# Patient Record
Sex: FEMALE | Race: BLACK OR AFRICAN AMERICAN | NOT HISPANIC OR LATINO | Employment: FULL TIME | URBAN - NONMETROPOLITAN AREA
[De-identification: names, ages, dates, MRNs, and addresses within clinical notes are randomized per-mention and may not be internally consistent; named-entity substitution may affect disease eponyms.]

---

## 2020-11-16 ENCOUNTER — HOSPITAL ENCOUNTER (EMERGENCY)
Facility: HOSPITAL | Age: 30
Discharge: HOME | End: 2020-11-16
Attending: EMERGENCY MEDICINE
Payer: MEDICAID

## 2020-11-16 VITALS
HEART RATE: 101 BPM | DIASTOLIC BLOOD PRESSURE: 71 MMHG | WEIGHT: 203 LBS | RESPIRATION RATE: 18 BRPM | SYSTOLIC BLOOD PRESSURE: 142 MMHG | OXYGEN SATURATION: 99 % | BODY MASS INDEX: 35.97 KG/M2 | HEIGHT: 63 IN | TEMPERATURE: 98 F

## 2020-11-16 DIAGNOSIS — J32.9 SINUSITIS, UNSPECIFIED CHRONICITY, UNSPECIFIED LOCATION: Primary | ICD-10-CM

## 2020-11-16 PROCEDURE — 99284 EMERGENCY DEPT VISIT MOD MDM: CPT

## 2020-11-16 RX ORDER — AMOXICILLIN AND CLAVULANATE POTASSIUM 875; 125 MG/1; MG/1
1 TABLET, FILM COATED ORAL 2 TIMES DAILY
Qty: 20 TABLET | Refills: 0 | Status: SHIPPED | OUTPATIENT
Start: 2020-11-16 | End: 2020-11-26

## 2020-11-16 RX ORDER — FLUTICASONE PROPIONATE 50 MCG
1 SPRAY, SUSPENSION (ML) NASAL 2 TIMES DAILY PRN
Qty: 16 G | Refills: 0 | Status: SHIPPED | OUTPATIENT
Start: 2020-11-16

## 2020-11-16 RX ORDER — IBUPROFEN 800 MG/1
800 TABLET ORAL EVERY 8 HOURS PRN
Qty: 20 TABLET | Refills: 0 | Status: SHIPPED | OUTPATIENT
Start: 2020-11-16 | End: 2020-11-21

## 2020-11-16 RX ORDER — CETIRIZINE HYDROCHLORIDE 10 MG/1
10 TABLET ORAL DAILY
Qty: 30 TABLET | Refills: 0 | Status: SHIPPED | OUTPATIENT
Start: 2020-11-16 | End: 2021-05-18

## 2020-11-16 NOTE — ED PROVIDER NOTES
Encounter Date: 11/16/2020       History     Chief Complaint   Patient presents with    Nasal Congestion     Nasal congestion with blood tinged mucus when blowing nose x1 week     29-year-old female complaining of sinus congestion, yellow nasal drainage with blowing her nose x2 weeks.  No sore throat.  No fever.  No other symptomatology.  States she came in this evening secondary to difficulty breathing from her nasal congestion while she was sleeping this evening.  No prior treatment.        Review of patient's allergies indicates:   Allergen Reactions    Iodine and iodide containing products      Lip swelling     History reviewed. No pertinent past medical history.  History reviewed. No pertinent surgical history.  History reviewed. No pertinent family history.  Social History     Tobacco Use    Smoking status: Never Smoker    Smokeless tobacco: Never Used   Substance Use Topics    Alcohol use: Not Currently    Drug use: Not on file     Review of Systems   Constitutional: Negative for fever.   HENT: Positive for congestion. Negative for ear pain, facial swelling, nosebleeds, rhinorrhea, sinus pressure, sinus pain, sneezing and sore throat.    Eyes: Negative for visual disturbance.   Respiratory: Negative for shortness of breath.    Cardiovascular: Negative for chest pain.   Gastrointestinal: Negative for abdominal pain.   Genitourinary: Negative for difficulty urinating.   Musculoskeletal: Negative for back pain.   Skin: Negative for rash.   Neurological: Negative for headaches.       Physical Exam     Initial Vitals [11/16/20 0149]   BP Pulse Resp Temp SpO2   (!) 142/71 101 18 98.4 °F (36.9 °C) 99 %      MAP       --         Physical Exam    Nursing note and vitals reviewed.  Constitutional: She appears well-developed and well-nourished.   HENT:   Mouth/Throat: No oropharyngeal exudate.   No sinus tenderness   Eyes: EOM are normal. Pupils are equal, round, and reactive to light.   Neck: Normal range of  motion. Neck supple. No thyromegaly present. No JVD present.   Cardiovascular: Normal rate, regular rhythm, normal heart sounds and intact distal pulses. Exam reveals no gallop and no friction rub.    No murmur heard.  Pulmonary/Chest: Breath sounds normal. No respiratory distress.   Abdominal: Soft. Bowel sounds are normal.   Musculoskeletal: Normal range of motion. No tenderness or edema.   Neurological: She is alert and oriented to person, place, and time. She has normal strength.   Skin: Skin is warm and dry. Capillary refill takes less than 2 seconds.         ED Course   Procedures  Labs Reviewed - No data to display      patient declined COVID testing  Imaging Results    None      Due to sinusitis symptomatology ongoing 2 weeks without resolution will treat with antibiotics, antihistamines, steroid nasal spray, anti-inflammatories.                                 Clinical Impression:       ICD-10-CM ICD-9-CM   1. Sinusitis, unspecified chronicity, unspecified location  J32.9 473.9                          ED Disposition Condition    Discharge Stable        ED Prescriptions     Medication Sig Dispense Start Date End Date Auth. Provider    amoxicillin-clavulanate 875-125mg (AUGMENTIN) 875-125 mg per tablet Take 1 tablet by mouth 2 (two) times daily. for 10 days 20 tablet 11/16/2020 11/26/2020 Timbo Luna MD    fluticasone propionate (FLONASE) 50 mcg/actuation nasal spray 1 spray (50 mcg total) by Each Nostril route 2 (two) times daily as needed for Rhinitis. 16 g 11/16/2020  Timbo Luna MD    cetirizine (ZYRTEC) 10 MG tablet Take 1 tablet (10 mg total) by mouth once daily. 30 tablet 11/16/2020 11/16/2021 Timbo Luna MD    ibuprofen (ADVIL,MOTRIN) 800 MG tablet Take 1 tablet (800 mg total) by mouth every 8 (eight) hours as needed for Pain. 20 tablet 11/16/2020 11/21/2020 Timbo Luna MD        Follow-up Information     Follow up With Specialties Details Why Contact Info    [facility]  MANOHAR Castillo MD Otolaryngology Schedule an appointment as soon as possible for a visit  or ENT of your choice if therapy does not resolve symptoms [address] [address]  [phone]                                         Timbo Luna MD  11/16/20 0223

## 2020-12-07 ENCOUNTER — TELEPHONE (OUTPATIENT)
Dept: OBSTETRICS AND GYNECOLOGY | Facility: CLINIC | Age: 30
End: 2020-12-07

## 2020-12-07 RX ORDER — NORETHINDRONE 0.35 MG
1 KIT ORAL DAILY
COMMUNITY
Start: 2020-11-07 | End: 2020-12-08 | Stop reason: ALTCHOICE

## 2020-12-08 ENCOUNTER — OFFICE VISIT (OUTPATIENT)
Dept: OBSTETRICS AND GYNECOLOGY | Facility: CLINIC | Age: 30
End: 2020-12-08
Payer: MEDICAID

## 2020-12-08 VITALS
WEIGHT: 203 LBS | DIASTOLIC BLOOD PRESSURE: 55 MMHG | HEART RATE: 66 BPM | BODY MASS INDEX: 35.97 KG/M2 | SYSTOLIC BLOOD PRESSURE: 86 MMHG | HEIGHT: 63 IN

## 2020-12-08 DIAGNOSIS — N92.6 IRREGULAR MENSES: Primary | ICD-10-CM

## 2020-12-08 DIAGNOSIS — Z30.41 SURVEILLANCE OF CONTRACEPTIVE PILL: ICD-10-CM

## 2020-12-08 LAB — HCG INTACT+B SERPL-ACNC: <1 MIU/ML

## 2020-12-08 PROCEDURE — 99999 PR PBB SHADOW E&M-EST. PATIENT-LVL II: ICD-10-PCS | Mod: PBBFAC,,, | Performed by: OBSTETRICS & GYNECOLOGY

## 2020-12-08 PROCEDURE — 84702 CHORIONIC GONADOTROPIN TEST: CPT

## 2020-12-08 PROCEDURE — 99213 PR OFFICE/OUTPT VISIT, EST, LEVL III, 20-29 MIN: ICD-10-PCS | Mod: S$PBB,,, | Performed by: OBSTETRICS & GYNECOLOGY

## 2020-12-08 PROCEDURE — 99212 OFFICE O/P EST SF 10 MIN: CPT | Mod: PBBFAC | Performed by: OBSTETRICS & GYNECOLOGY

## 2020-12-08 PROCEDURE — 99213 OFFICE O/P EST LOW 20 MIN: CPT | Mod: S$PBB,,, | Performed by: OBSTETRICS & GYNECOLOGY

## 2020-12-08 PROCEDURE — 99999 PR PBB SHADOW E&M-EST. PATIENT-LVL II: CPT | Mod: PBBFAC,,, | Performed by: OBSTETRICS & GYNECOLOGY

## 2020-12-08 RX ORDER — NORGESTIMATE AND ETHINYL ESTRADIOL 0.25-0.035
1 KIT ORAL DAILY
Qty: 28 TABLET | Refills: 11 | Status: SHIPPED | OUTPATIENT
Start: 2020-12-08 | End: 2021-11-16

## 2020-12-08 NOTE — PROGRESS NOTES
Subjective:    Patient ID: Jay John is a 29 y.o. y.o. female    Chief Complaint:   Chief Complaint   Patient presents with    Metrorrhagia     cycles lasting 5-7 days and came early last month       History of Present Illness:  Jay presents today for evaluation of irregular menses.  Patient is still on progesterone only pill.  She has since breastfeeding and states that her menses have been a little irregular.  She states that she took a pregnancy test last week and it was positive she has taken a couple more and some were positive in some more negative.  She has also complained of some nausea      Review of Systems   Constitutional: Negative for chills, fatigue and fever.   Respiratory: Negative for shortness of breath.    Cardiovascular: Negative for chest pain.   Gastrointestinal: Positive for nausea. Negative for abdominal pain, constipation and diarrhea.   Genitourinary: Negative for bladder incontinence, dysuria, hot flashes, pelvic pain and vaginal bleeding.   Neurological: Negative for headaches.   Psychiatric/Behavioral: Negative for depression.         Objective:    Vital Signs:  Vitals:    12/08/20 1118   BP: (!) 86/55   Pulse: 66       Physical Exam:  General:  alert, no distress   Skin:  Skin color, texture, turgor normal. No rashes or lesions   Neck: supple, trachea midline, no adenopathy or thyromegaly   Respiratory:  clear to auscultation bilaterally   Heart:  regular rate and rhythm, S1, S2 normal, no murmur, click, rub or gallop   Abdomen:  Soft, non-tender. Bowel sounds normal. No masses,  no organomegaly   Extremities: No cyanosis, clubbing, edema               Assessment:      1. Irregular menses    2. Surveillance of contraceptive pill          Plan:      Irregular menses  -     HCG, Quantitative; Future; Expected date: 12/08/2020    Surveillance of contraceptive pill    Other orders  -     norgestimate-ethinyl estradioL (ORTHO-CYCLEN) 0.25-35 mg-mcg per tablet; Take 1 tablet by mouth  once daily.  Dispense: 28 tablet; Refill: 11          Peri Jones MD, FACOG

## 2021-01-21 ENCOUNTER — OFFICE VISIT (OUTPATIENT)
Dept: OBSTETRICS AND GYNECOLOGY | Facility: CLINIC | Age: 31
End: 2021-01-21
Payer: MEDICAID

## 2021-01-21 VITALS
WEIGHT: 198 LBS | SYSTOLIC BLOOD PRESSURE: 126 MMHG | HEART RATE: 55 BPM | HEIGHT: 63 IN | BODY MASS INDEX: 35.08 KG/M2 | DIASTOLIC BLOOD PRESSURE: 60 MMHG

## 2021-01-21 DIAGNOSIS — B37.9 YEAST INFECTION: Primary | ICD-10-CM

## 2021-01-21 DIAGNOSIS — B35.6 TINEA CRURIS: ICD-10-CM

## 2021-01-21 PROCEDURE — 99999 PR PBB SHADOW E&M-EST. PATIENT-LVL III: CPT | Mod: PBBFAC,,, | Performed by: OBSTETRICS & GYNECOLOGY

## 2021-01-21 PROCEDURE — 99213 PR OFFICE/OUTPT VISIT, EST, LEVL III, 20-29 MIN: ICD-10-PCS | Mod: S$PBB,,, | Performed by: OBSTETRICS & GYNECOLOGY

## 2021-01-21 PROCEDURE — 99213 OFFICE O/P EST LOW 20 MIN: CPT | Mod: S$PBB,,, | Performed by: OBSTETRICS & GYNECOLOGY

## 2021-01-21 PROCEDURE — 99213 OFFICE O/P EST LOW 20 MIN: CPT | Mod: PBBFAC | Performed by: OBSTETRICS & GYNECOLOGY

## 2021-01-21 PROCEDURE — 99999 PR PBB SHADOW E&M-EST. PATIENT-LVL III: ICD-10-PCS | Mod: PBBFAC,,, | Performed by: OBSTETRICS & GYNECOLOGY

## 2021-01-21 RX ORDER — FLUCONAZOLE 100 MG/1
100 TABLET ORAL DAILY
Qty: 3 TABLET | Refills: 0 | Status: SHIPPED | OUTPATIENT
Start: 2021-01-21 | End: 2021-01-24

## 2021-01-21 RX ORDER — CLOTRIMAZOLE 1 %
CREAM (GRAM) TOPICAL
Qty: 30 G | Refills: 1 | Status: SHIPPED | OUTPATIENT
Start: 2021-01-21 | End: 2021-05-18

## 2021-05-18 ENCOUNTER — OFFICE VISIT (OUTPATIENT)
Dept: OBSTETRICS AND GYNECOLOGY | Facility: CLINIC | Age: 31
End: 2021-05-18
Payer: MEDICAID

## 2021-05-18 VITALS
WEIGHT: 192 LBS | DIASTOLIC BLOOD PRESSURE: 78 MMHG | HEART RATE: 73 BPM | BODY MASS INDEX: 34.02 KG/M2 | HEIGHT: 63 IN | SYSTOLIC BLOOD PRESSURE: 129 MMHG

## 2021-05-18 DIAGNOSIS — Z01.419 ROUTINE GYNECOLOGICAL EXAMINATION: Primary | ICD-10-CM

## 2021-05-18 DIAGNOSIS — N89.8 VAGINAL DISCHARGE: ICD-10-CM

## 2021-05-18 DIAGNOSIS — Z11.3 SCREENING EXAMINATION FOR VENEREAL DISEASE: ICD-10-CM

## 2021-05-18 DIAGNOSIS — Z12.4 SCREENING FOR MALIGNANT NEOPLASM OF THE CERVIX: ICD-10-CM

## 2021-05-18 PROCEDURE — 86696 HERPES SIMPLEX TYPE 2 TEST: CPT | Performed by: NURSE PRACTITIONER

## 2021-05-18 PROCEDURE — 86703 HIV-1/HIV-2 1 RESULT ANTBDY: CPT | Performed by: NURSE PRACTITIONER

## 2021-05-18 PROCEDURE — 87661 TRICHOMONAS VAGINALIS AMPLIF: CPT | Performed by: NURSE PRACTITIONER

## 2021-05-18 PROCEDURE — 86592 SYPHILIS TEST NON-TREP QUAL: CPT | Performed by: NURSE PRACTITIONER

## 2021-05-18 PROCEDURE — 99213 OFFICE O/P EST LOW 20 MIN: CPT | Mod: PBBFAC | Performed by: NURSE PRACTITIONER

## 2021-05-18 PROCEDURE — 80074 ACUTE HEPATITIS PANEL: CPT | Performed by: NURSE PRACTITIONER

## 2021-05-18 PROCEDURE — 99395 PREV VISIT EST AGE 18-39: CPT | Mod: S$PBB,,, | Performed by: NURSE PRACTITIONER

## 2021-05-18 PROCEDURE — 99999 PR PBB SHADOW E&M-EST. PATIENT-LVL III: ICD-10-PCS | Mod: PBBFAC,,, | Performed by: NURSE PRACTITIONER

## 2021-05-18 PROCEDURE — 99395 PR PREVENTIVE VISIT,EST,18-39: ICD-10-PCS | Mod: S$PBB,,, | Performed by: NURSE PRACTITIONER

## 2021-05-18 PROCEDURE — 99999 PR PBB SHADOW E&M-EST. PATIENT-LVL III: CPT | Mod: PBBFAC,,, | Performed by: NURSE PRACTITIONER

## 2021-05-18 PROCEDURE — 86695 HERPES SIMPLEX TYPE 1 TEST: CPT | Performed by: NURSE PRACTITIONER

## 2021-05-18 RX ORDER — METRONIDAZOLE 500 MG/1
500 TABLET ORAL 2 TIMES DAILY
Qty: 14 TABLET | Refills: 0 | Status: SHIPPED | OUTPATIENT
Start: 2021-05-18 | End: 2021-05-25

## 2021-05-19 LAB
HSV1 IGG SERPL QL IA: POSITIVE
HSV2 IGG SERPL QL IA: NEGATIVE
RPR SER QL: NORMAL

## 2021-05-20 LAB
HAV IGM SERPL QL IA: NEGATIVE
HBV CORE IGM SERPL QL IA: NEGATIVE
HBV SURFACE AG SERPL QL IA: NEGATIVE
HCV AB SERPL QL IA: NEGATIVE
HIV 1+2 AB+HIV1 P24 AG SERPL QL IA: NEGATIVE

## 2021-05-21 LAB
CHLAMYDIA/N. GONORROHEAE AMP DNA: NORMAL
HPV16+18+H RISK 12 DNA CVX-IMP: NORMAL
LIQUID BASED PAP SMEAR, SCREENING: NORMAL
TRICHOMONAS VAGINALIS, DNA, URINE: NORMAL

## 2021-09-20 ENCOUNTER — HOSPITAL ENCOUNTER (EMERGENCY)
Facility: HOSPITAL | Age: 31
Discharge: HOME | End: 2021-09-20
Attending: STUDENT IN AN ORGANIZED HEALTH CARE EDUCATION/TRAINING PROGRAM
Payer: MEDICAID

## 2021-09-20 VITALS
HEIGHT: 63 IN | OXYGEN SATURATION: 96 % | WEIGHT: 203 LBS | SYSTOLIC BLOOD PRESSURE: 119 MMHG | DIASTOLIC BLOOD PRESSURE: 85 MMHG | HEART RATE: 80 BPM | TEMPERATURE: 98 F | RESPIRATION RATE: 16 BRPM | BODY MASS INDEX: 35.97 KG/M2

## 2021-09-20 DIAGNOSIS — M25.552 HIP PAIN, LEFT: ICD-10-CM
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
## 2021-09-20 PROCEDURE — 25000003 PHARM REV CODE 250: Performed by: STUDENT IN AN ORGANIZED HEALTH CARE EDUCATION/TRAINING PROGRAM

## 2021-09-20 PROCEDURE — 99283 EMERGENCY DEPT VISIT LOW MDM: CPT

## 2021-09-20 RX ORDER — IBUPROFEN 600 MG/1
600 TABLET ORAL
Status: COMPLETED | OUTPATIENT
Start: 2021-09-20 | End: 2021-09-20

## 2021-09-20 RX ORDER — CYCLOBENZAPRINE HCL 10 MG
10 TABLET ORAL 3 TIMES DAILY PRN
Qty: 15 TABLET | Refills: 0 | Status: SHIPPED | OUTPATIENT
Start: 2021-09-20 | End: 2021-09-25

## 2021-09-20 RX ADMIN — IBUPROFEN 600 MG: 600 TABLET, FILM COATED ORAL at 10:09

## 2021-10-21 ENCOUNTER — OFFICE VISIT (OUTPATIENT)
Dept: OBSTETRICS AND GYNECOLOGY | Facility: CLINIC | Age: 31
End: 2021-10-21
Payer: MEDICAID

## 2021-10-21 VITALS
BODY MASS INDEX: 35.79 KG/M2 | HEIGHT: 63 IN | DIASTOLIC BLOOD PRESSURE: 82 MMHG | WEIGHT: 202 LBS | HEART RATE: 65 BPM | SYSTOLIC BLOOD PRESSURE: 126 MMHG

## 2021-10-21 DIAGNOSIS — R30.0 DYSURIA: Primary | ICD-10-CM

## 2021-10-21 DIAGNOSIS — Z11.3 SCREENING FOR STDS (SEXUALLY TRANSMITTED DISEASES): ICD-10-CM

## 2021-10-21 LAB
BILIRUB SERPL-MCNC: NORMAL MG/DL
BLOOD URINE, POC: NORMAL
CLARITY, POC UA: CLEAR
COLOR, POC UA: NORMAL
GLUCOSE UR QL STRIP: NORMAL
KETONES UR QL STRIP: NORMAL
LEUKOCYTE ESTERASE URINE, POC: NORMAL
NITRITE, POC UA: NORMAL
PH, POC UA: 5.5
PROTEIN, POC: NORMAL
SPECIFIC GRAVITY, POC UA: 1
UROBILINOGEN, POC UA: NORMAL

## 2021-10-21 PROCEDURE — 87088 URINE BACTERIA CULTURE: CPT | Performed by: NURSE PRACTITIONER

## 2021-10-21 PROCEDURE — 81002 URINALYSIS NONAUTO W/O SCOPE: CPT | Mod: PBBFAC | Performed by: NURSE PRACTITIONER

## 2021-10-21 PROCEDURE — 99213 PR OFFICE/OUTPT VISIT, EST, LEVL III, 20-29 MIN: ICD-10-PCS | Mod: S$PBB,,, | Performed by: NURSE PRACTITIONER

## 2021-10-21 PROCEDURE — 99213 OFFICE O/P EST LOW 20 MIN: CPT | Mod: PBBFAC | Performed by: NURSE PRACTITIONER

## 2021-10-21 PROCEDURE — 87077 CULTURE AEROBIC IDENTIFY: CPT | Performed by: NURSE PRACTITIONER

## 2021-10-21 PROCEDURE — 87661 TRICHOMONAS VAGINALIS AMPLIF: CPT | Performed by: NURSE PRACTITIONER

## 2021-10-21 PROCEDURE — 87086 URINE CULTURE/COLONY COUNT: CPT | Performed by: NURSE PRACTITIONER

## 2021-10-21 PROCEDURE — 99213 OFFICE O/P EST LOW 20 MIN: CPT | Mod: S$PBB,,, | Performed by: NURSE PRACTITIONER

## 2021-10-21 PROCEDURE — 99999 PR PBB SHADOW E&M-EST. PATIENT-LVL III: CPT | Mod: PBBFAC,,, | Performed by: NURSE PRACTITIONER

## 2021-10-21 PROCEDURE — 87186 SC STD MICRODIL/AGAR DIL: CPT | Performed by: NURSE PRACTITIONER

## 2021-10-21 PROCEDURE — 99999 PR PBB SHADOW E&M-EST. PATIENT-LVL III: ICD-10-PCS | Mod: PBBFAC,,, | Performed by: NURSE PRACTITIONER

## 2021-10-21 RX ORDER — SERTRALINE HYDROCHLORIDE 50 MG/1
50 TABLET, FILM COATED ORAL DAILY
COMMUNITY
End: 2024-01-02

## 2021-10-21 RX ORDER — CIPROFLOXACIN 500 MG/1
500 TABLET ORAL 2 TIMES DAILY
Qty: 14 TABLET | Refills: 0 | Status: SHIPPED | OUTPATIENT
Start: 2021-10-21 | End: 2021-10-28

## 2021-10-25 LAB — BACTERIA UR CULT: ABNORMAL

## 2021-12-01 LAB
CHLAMYDIA/N. GONORROHEAE AMP DNA: NORMAL
TRICHOMONAS VAGINALIS, DNA, URINE: NORMAL

## 2022-03-08 RX ORDER — NORGESTIMATE AND ETHINYL ESTRADIOL 0.25-0.035
1 KIT ORAL DAILY
Qty: 28 TABLET | Refills: 3 | Status: SHIPPED | OUTPATIENT
Start: 2022-03-08 | End: 2022-08-08 | Stop reason: SDUPTHER

## 2022-08-08 ENCOUNTER — OFFICE VISIT (OUTPATIENT)
Dept: OBSTETRICS AND GYNECOLOGY | Facility: CLINIC | Age: 32
End: 2022-08-08
Payer: MEDICAID

## 2022-08-08 VITALS
WEIGHT: 203 LBS | DIASTOLIC BLOOD PRESSURE: 62 MMHG | BODY MASS INDEX: 35.97 KG/M2 | SYSTOLIC BLOOD PRESSURE: 128 MMHG | HEIGHT: 63 IN

## 2022-08-08 DIAGNOSIS — Z01.419 ENCOUNTER FOR ANNUAL ROUTINE GYNECOLOGICAL EXAMINATION: ICD-10-CM

## 2022-08-08 DIAGNOSIS — Z30.41 SURVEILLANCE OF CONTRACEPTIVE PILL: ICD-10-CM

## 2022-08-08 DIAGNOSIS — Z12.4 SCREENING FOR MALIGNANT NEOPLASM OF THE CERVIX: Primary | ICD-10-CM

## 2022-08-08 PROCEDURE — 99395 PR PREVENTIVE VISIT,EST,18-39: ICD-10-PCS | Mod: S$PBB,,, | Performed by: OBSTETRICS & GYNECOLOGY

## 2022-08-08 PROCEDURE — 1159F MED LIST DOCD IN RCRD: CPT | Mod: CPTII,,, | Performed by: OBSTETRICS & GYNECOLOGY

## 2022-08-08 PROCEDURE — 3078F PR MOST RECENT DIASTOLIC BLOOD PRESSURE < 80 MM HG: ICD-10-PCS | Mod: CPTII,,, | Performed by: OBSTETRICS & GYNECOLOGY

## 2022-08-08 PROCEDURE — 3078F DIAST BP <80 MM HG: CPT | Mod: CPTII,,, | Performed by: OBSTETRICS & GYNECOLOGY

## 2022-08-08 PROCEDURE — 99999 PR PBB SHADOW E&M-EST. PATIENT-LVL II: CPT | Mod: PBBFAC,,, | Performed by: OBSTETRICS & GYNECOLOGY

## 2022-08-08 PROCEDURE — 3074F PR MOST RECENT SYSTOLIC BLOOD PRESSURE < 130 MM HG: ICD-10-PCS | Mod: CPTII,,, | Performed by: OBSTETRICS & GYNECOLOGY

## 2022-08-08 PROCEDURE — 99999 PR PBB SHADOW E&M-EST. PATIENT-LVL II: ICD-10-PCS | Mod: PBBFAC,,, | Performed by: OBSTETRICS & GYNECOLOGY

## 2022-08-08 PROCEDURE — 99212 OFFICE O/P EST SF 10 MIN: CPT | Mod: PBBFAC | Performed by: OBSTETRICS & GYNECOLOGY

## 2022-08-08 PROCEDURE — 3008F PR BODY MASS INDEX (BMI) DOCUMENTED: ICD-10-PCS | Mod: CPTII,,, | Performed by: OBSTETRICS & GYNECOLOGY

## 2022-08-08 PROCEDURE — 1159F PR MEDICATION LIST DOCUMENTED IN MEDICAL RECORD: ICD-10-PCS | Mod: CPTII,,, | Performed by: OBSTETRICS & GYNECOLOGY

## 2022-08-08 PROCEDURE — 1160F PR REVIEW ALL MEDS BY PRESCRIBER/CLIN PHARMACIST DOCUMENTED: ICD-10-PCS | Mod: CPTII,,, | Performed by: OBSTETRICS & GYNECOLOGY

## 2022-08-08 PROCEDURE — 3074F SYST BP LT 130 MM HG: CPT | Mod: CPTII,,, | Performed by: OBSTETRICS & GYNECOLOGY

## 2022-08-08 PROCEDURE — 99395 PREV VISIT EST AGE 18-39: CPT | Mod: S$PBB,,, | Performed by: OBSTETRICS & GYNECOLOGY

## 2022-08-08 PROCEDURE — 3008F BODY MASS INDEX DOCD: CPT | Mod: CPTII,,, | Performed by: OBSTETRICS & GYNECOLOGY

## 2022-08-08 PROCEDURE — 1160F RVW MEDS BY RX/DR IN RCRD: CPT | Mod: CPTII,,, | Performed by: OBSTETRICS & GYNECOLOGY

## 2022-08-08 RX ORDER — NORGESTIMATE AND ETHINYL ESTRADIOL 0.25-0.035
1 KIT ORAL DAILY
Qty: 28 TABLET | Refills: 12 | Status: SHIPPED | OUTPATIENT
Start: 2022-08-08 | End: 2023-06-19

## 2022-08-08 NOTE — PROGRESS NOTES
"SUBJECTIVE:   31 y.o. female for annual routine Pap and checkup. Contemplating tubal ligation or IUD but not sure about either  Current Outpatient Medications   Medication Sig Dispense Refill    fluticasone propionate (FLONASE) 50 mcg/actuation nasal spray 1 spray (50 mcg total) by Each Nostril route 2 (two) times daily as needed for Rhinitis. 16 g 0    sertraline (ZOLOFT) 50 MG tablet Take 50 mg by mouth once daily.      norgestimate-ethinyl estradioL (SPRINTEC, 28,) 0.25-35 mg-mcg per tablet Take 1 tablet by mouth once daily. 28 tablet 12     No current facility-administered medications for this visit.     Allergies: Iodine and iodide containing products   Patient's last menstrual period was 08/02/2022.    ROS:  Feeling well. No dyspnea or chest pain on exertion.  No abdominal pain, change in bowel habits, black or bloody stools.  No urinary tract symptoms. GYN ROS: normal menses, no abnormal bleeding, pelvic pain or discharge, no breast pain or new or enlarging lumps on self exam. No neurological complaints.    OBJECTIVE:   The patient appears well, alert, oriented x 3, in no distress.  /62   Ht 5' 3" (1.6 m)   Wt 92.1 kg (203 lb)   LMP 08/02/2022   BMI 35.96 kg/m²   ENT normal.  Neck supple. No adenopathy or thyromegaly. DEE. Lungs are clear, good air entry, no wheezes, rhonchi or rales. S1 and S2 normal, no murmurs, regular rate and rhythm. Abdomen soft without tenderness, guarding, mass or organomegaly. Extremities show no edema, normal peripheral pulses. Neurological is normal, no focal findings.    BREAST EXAM: breasts appear normal, no suspicious masses, no skin or nipple changes or axillary nodes. Bilateral nipple piercing noted    PELVIC EXAM: VULVA: normal appearing vulva with no masses, tenderness or lesions, VAGINA: normal appearing vagina with normal color and discharge, no lesions, CERVIX: normal appearing cervix without discharge or lesions, UTERUS: uterus is normal size, shape, " consistency and nontender, ADNEXA: normal adnexa in size, nontender and no masses    ASSESSMENT:   well woman  no contraindication to continue use of oral contraceptives    PLAN:   pap smear  return annually or prn

## 2022-08-16 LAB
HPV16+18+H RISK 12 DNA CVX-IMP: NORMAL
LIQUID BASED PAP SMEAR, SCREENING: NORMAL

## 2023-06-19 RX ORDER — NORGESTIMATE AND ETHINYL ESTRADIOL 0.25-0.035
KIT ORAL
Qty: 28 TABLET | Refills: 2 | Status: SHIPPED | OUTPATIENT
Start: 2023-06-19 | End: 2023-10-05

## 2023-10-05 RX ORDER — NORGESTIMATE AND ETHINYL ESTRADIOL 0.25-0.035
KIT ORAL
Qty: 28 TABLET | Refills: 2 | Status: SHIPPED | OUTPATIENT
Start: 2023-10-05 | End: 2024-01-02 | Stop reason: SDUPTHER

## 2023-11-08 ENCOUNTER — OFFICE VISIT (OUTPATIENT)
Dept: OBSTETRICS AND GYNECOLOGY | Facility: CLINIC | Age: 33
End: 2023-11-08
Payer: MEDICAID

## 2023-11-08 VITALS
WEIGHT: 188 LBS | DIASTOLIC BLOOD PRESSURE: 80 MMHG | HEART RATE: 85 BPM | SYSTOLIC BLOOD PRESSURE: 130 MMHG | BODY MASS INDEX: 33.3 KG/M2

## 2023-11-08 DIAGNOSIS — N64.52 BREAST DISCHARGE: Primary | ICD-10-CM

## 2023-11-08 DIAGNOSIS — Z78.9 BODY PIERCING: ICD-10-CM

## 2023-11-08 PROCEDURE — 3008F BODY MASS INDEX DOCD: CPT | Mod: CPTII,,, | Performed by: OBSTETRICS & GYNECOLOGY

## 2023-11-08 PROCEDURE — 1159F MED LIST DOCD IN RCRD: CPT | Mod: CPTII,,, | Performed by: OBSTETRICS & GYNECOLOGY

## 2023-11-08 PROCEDURE — 1160F RVW MEDS BY RX/DR IN RCRD: CPT | Mod: CPTII,,, | Performed by: OBSTETRICS & GYNECOLOGY

## 2023-11-08 PROCEDURE — 1160F PR REVIEW ALL MEDS BY PRESCRIBER/CLIN PHARMACIST DOCUMENTED: ICD-10-PCS | Mod: CPTII,,, | Performed by: OBSTETRICS & GYNECOLOGY

## 2023-11-08 PROCEDURE — 3079F DIAST BP 80-89 MM HG: CPT | Mod: CPTII,,, | Performed by: OBSTETRICS & GYNECOLOGY

## 2023-11-08 PROCEDURE — 1159F PR MEDICATION LIST DOCUMENTED IN MEDICAL RECORD: ICD-10-PCS | Mod: CPTII,,, | Performed by: OBSTETRICS & GYNECOLOGY

## 2023-11-08 PROCEDURE — 3075F SYST BP GE 130 - 139MM HG: CPT | Mod: CPTII,,, | Performed by: OBSTETRICS & GYNECOLOGY

## 2023-11-08 PROCEDURE — 99213 OFFICE O/P EST LOW 20 MIN: CPT | Mod: S$PBB,,, | Performed by: OBSTETRICS & GYNECOLOGY

## 2023-11-08 PROCEDURE — 99213 OFFICE O/P EST LOW 20 MIN: CPT | Mod: PBBFAC | Performed by: OBSTETRICS & GYNECOLOGY

## 2023-11-08 PROCEDURE — 3075F PR MOST RECENT SYSTOLIC BLOOD PRESS GE 130-139MM HG: ICD-10-PCS | Mod: CPTII,,, | Performed by: OBSTETRICS & GYNECOLOGY

## 2023-11-08 PROCEDURE — 99999 PR PBB SHADOW E&M-EST. PATIENT-LVL III: ICD-10-PCS | Mod: PBBFAC,,, | Performed by: OBSTETRICS & GYNECOLOGY

## 2023-11-08 PROCEDURE — 3008F PR BODY MASS INDEX (BMI) DOCUMENTED: ICD-10-PCS | Mod: CPTII,,, | Performed by: OBSTETRICS & GYNECOLOGY

## 2023-11-08 PROCEDURE — 99999 PR PBB SHADOW E&M-EST. PATIENT-LVL III: CPT | Mod: PBBFAC,,, | Performed by: OBSTETRICS & GYNECOLOGY

## 2023-11-08 PROCEDURE — 3079F PR MOST RECENT DIASTOLIC BLOOD PRESSURE 80-89 MM HG: ICD-10-PCS | Mod: CPTII,,, | Performed by: OBSTETRICS & GYNECOLOGY

## 2023-11-08 PROCEDURE — 99213 PR OFFICE/OUTPT VISIT, EST, LEVL III, 20-29 MIN: ICD-10-PCS | Mod: S$PBB,,, | Performed by: OBSTETRICS & GYNECOLOGY

## 2023-11-08 NOTE — PROGRESS NOTES
Subjective:    Patient ID: Jay John is a 32 y.o. y.o. female.     Chief Complaint:   Chief Complaint   Patient presents with    Breast Discharge     Right breast       History of Present Illness:   Jay presents for evaluation of nipple discharge noted by SBE several days ago. She denies breast tenderness, denies masses, admits to one episode of bloody nipple discharge in the right breast. Symptoms have been intermittent since beginning. Patient's last menstrual period was 11/05/2023..    Had bilateral nipple piercing about 2 years ago.     Review of Systems   Constitutional:  Negative for chills and fever.   Respiratory:  Negative for shortness of breath.    Cardiovascular:  Negative for chest pain.   Gastrointestinal:  Negative for constipation.   Integumentary:  Positive for nipple discharge.   Neurological:  Negative for headaches.   Breast: Positive for nipple discharge.        Physical Exam:   Vital Signs:  Vitals:    11/08/23 1039   BP: 130/80   Pulse: 85       General:  alert, no distress   Breasts: Left Breast:  Buddy piercing in nipple, Nipple protruding. No nipple discharge elicited. No palpable masses, erythema, skin changes, tenderness, or adenopathy.  Right Breast:  Buddy piercing in nipple, Nipple protruding. No nipple discharge elicited. No palpable masses, erythema, skin changes, tenderness, or adenopathy.     Discussed that the discharge could be from nonhealing of the piercing, but it could also be something more pathological.  Will obtain ultrasound and mammogram to help evaluate underlying tissue.  She reports that she has an appointment to see her  for evaluation as well.  All questions answered    I spent a total of 20 minutes on the day of the visit.  This includes face to face time and non-face to face time preparing to see the patient (eg, review of tests), obtaining and/or reviewing separately obtained history, documenting clinical information in the electronic or  other health record, independently interpreting results and communicating results to the patient/family/caregiver, or care coordinator.     Assessment:      1. Breast discharge    2. Body piercing          Plan:      Breast discharge  Comments:  right  Orders:  -     US Breast Right Limited; Future; Expected date: 11/08/2023  -     Mammo Digital Diagnostic Right with [facility]; Future; Expected date: 11/08/2023    Body piercing          Peri Jones MD FACOG    11/08/2023  10:58 AM

## 2023-11-28 ENCOUNTER — HOSPITAL ENCOUNTER (OUTPATIENT)
Dept: RADIOLOGY | Facility: HOSPITAL | Age: 33
Discharge: HOME | End: 2023-11-28
Attending: OBSTETRICS & GYNECOLOGY
Payer: MEDICAID

## 2023-11-28 VITALS — BODY MASS INDEX: 33.31 KG/M2 | HEIGHT: 63 IN | WEIGHT: 188 LBS

## 2023-11-28 DIAGNOSIS — N64.52 BREAST DISCHARGE: ICD-10-CM

## 2023-11-28 PROCEDURE — 77066 DX MAMMO INCL CAD BI: CPT | Mod: TC

## 2023-11-28 PROCEDURE — 76642 ULTRASOUND BREAST LIMITED: CPT | Mod: TC,RT

## 2023-11-30 NOTE — PROGRESS NOTES
Please call patient regarding results.  Normal mmg and ultrasound. Resume yearly screening at age 40

## 2024-01-02 ENCOUNTER — OFFICE VISIT (OUTPATIENT)
Dept: OBSTETRICS AND GYNECOLOGY | Facility: CLINIC | Age: 34
End: 2024-01-02
Payer: MEDICAID

## 2024-01-02 VITALS
WEIGHT: 200 LBS | HEART RATE: 71 BPM | BODY MASS INDEX: 35.44 KG/M2 | DIASTOLIC BLOOD PRESSURE: 79 MMHG | HEIGHT: 63 IN | SYSTOLIC BLOOD PRESSURE: 137 MMHG

## 2024-01-02 DIAGNOSIS — Z30.41 SURVEILLANCE OF CONTRACEPTIVE PILL: ICD-10-CM

## 2024-01-02 DIAGNOSIS — Z12.4 SCREENING FOR MALIGNANT NEOPLASM OF THE CERVIX: ICD-10-CM

## 2024-01-02 DIAGNOSIS — Z01.419 ENCOUNTER FOR ANNUAL ROUTINE GYNECOLOGICAL EXAMINATION: Primary | ICD-10-CM

## 2024-01-02 PROCEDURE — 99999 PR PBB SHADOW E&M-EST. PATIENT-LVL II: CPT | Mod: PBBFAC,,, | Performed by: OBSTETRICS & GYNECOLOGY

## 2024-01-02 PROCEDURE — 99395 PREV VISIT EST AGE 18-39: CPT | Mod: S$PBB,,, | Performed by: OBSTETRICS & GYNECOLOGY

## 2024-01-02 PROCEDURE — 1159F MED LIST DOCD IN RCRD: CPT | Mod: CPTII,,, | Performed by: OBSTETRICS & GYNECOLOGY

## 2024-01-02 PROCEDURE — 1160F RVW MEDS BY RX/DR IN RCRD: CPT | Mod: CPTII,,, | Performed by: OBSTETRICS & GYNECOLOGY

## 2024-01-02 PROCEDURE — 3078F DIAST BP <80 MM HG: CPT | Mod: CPTII,,, | Performed by: OBSTETRICS & GYNECOLOGY

## 2024-01-02 PROCEDURE — 99212 OFFICE O/P EST SF 10 MIN: CPT | Mod: PBBFAC | Performed by: OBSTETRICS & GYNECOLOGY

## 2024-01-02 PROCEDURE — 3008F BODY MASS INDEX DOCD: CPT | Mod: CPTII,,, | Performed by: OBSTETRICS & GYNECOLOGY

## 2024-01-02 PROCEDURE — 3075F SYST BP GE 130 - 139MM HG: CPT | Mod: CPTII,,, | Performed by: OBSTETRICS & GYNECOLOGY

## 2024-01-02 RX ORDER — NORGESTIMATE AND ETHINYL ESTRADIOL 0.25-0.035
1 KIT ORAL DAILY
Qty: 28 TABLET | Refills: 12 | Status: SHIPPED | OUTPATIENT
Start: 2024-01-02

## 2024-01-02 NOTE — PROGRESS NOTES
"SUBJECTIVE:   33 y.o. female for annual routine Pap and checkup. No complaints today  Current Outpatient Medications   Medication Sig Dispense Refill    fluticasone propionate (FLONASE) 50 mcg/actuation nasal spray 1 spray (50 mcg total) by Each Nostril route 2 (two) times daily as needed for Rhinitis. 16 g 0    norgestimate-ethinyl estradioL (SPRINTEC, 28,) 0.25-35 mg-mcg per tablet Take 1 tablet by mouth once daily. 28 tablet 12     No current facility-administered medications for this visit.     Allergies: Iodine and iodide containing products   Patient's last menstrual period was 12/08/2023 (within days).    ROS:  Feeling well. No dyspnea or chest pain on exertion.  No abdominal pain, change in bowel habits, black or bloody stools.  No urinary tract symptoms. GYN ROS: normal menses, no abnormal bleeding, pelvic pain or discharge, no breast pain or new or enlarging lumps on self exam. No neurological complaints.    OBJECTIVE:   The patient appears well, alert, oriented x 3, in no distress.  /79   Pulse 71   Ht 5' 3" (1.6 m)   Wt 90.7 kg (200 lb)   LMP 12/08/2023 (Within Days)   BMI 35.43 kg/m²   ENT normal.  Neck supple. No adenopathy or thyromegaly. DEE. Lungs are clear, good air entry, no wheezes, rhonchi or rales. S1 and S2 normal, no murmurs, regular rate and rhythm. Abdomen soft without tenderness, guarding, mass or organomegaly. Extremities show no edema, normal peripheral pulses. Neurological is normal, no focal findings.    BREAST EXAM: breasts appear normal, no suspicious masses, no skin or nipple changes or axillary nodes    PELVIC EXAM: VULVA: normal appearing vulva with no masses, tenderness or lesions, VAGINA: normal appearing vagina with normal color and discharge, no lesions, CERVIX: normal appearing cervix without discharge or lesions, UTERUS: uterus is normal size, shape, consistency and nontender, ADNEXA: normal adnexa in size, nontender and no masses, PAP: Pap smear done today, " thin-prep method    ASSESSMENT:   well woman  no contraindication to continue use of oral contraceptives    PLAN:   pap smear  return annually or prn

## 2024-01-04 LAB
HPV16+18+H RISK 12 DNA CVX-IMP: NORMAL
LIQUID BASED PAP SMEAR, SCREENING: NORMAL

## 2024-02-28 ENCOUNTER — OFFICE VISIT (OUTPATIENT)
Dept: OBSTETRICS AND GYNECOLOGY | Facility: CLINIC | Age: 34
End: 2024-02-28
Payer: MEDICAID

## 2024-02-28 VITALS
HEART RATE: 71 BPM | WEIGHT: 191 LBS | DIASTOLIC BLOOD PRESSURE: 66 MMHG | BODY MASS INDEX: 33.83 KG/M2 | SYSTOLIC BLOOD PRESSURE: 114 MMHG

## 2024-02-28 DIAGNOSIS — R31.9 HEMATURIA, UNSPECIFIED TYPE: ICD-10-CM

## 2024-02-28 DIAGNOSIS — R30.0 DYSURIA: Primary | ICD-10-CM

## 2024-02-28 LAB
BILIRUB SERPL-MCNC: ABNORMAL MG/DL
BLOOD URINE, POC: ABNORMAL
CLARITY, POC UA: CLEAR
COLOR, POC UA: YELLOW
GLUCOSE UR QL STRIP: ABNORMAL
KETONES UR QL STRIP: ABNORMAL
LEUKOCYTE ESTERASE URINE, POC: ABNORMAL
NITRITE, POC UA: ABNORMAL
PH, POC UA: 7
PROTEIN, POC: ABNORMAL
SPECIFIC GRAVITY, POC UA: 1.03
UROBILINOGEN, POC UA: 0.2

## 2024-02-28 PROCEDURE — 1160F RVW MEDS BY RX/DR IN RCRD: CPT | Mod: CPTII,,, | Performed by: OBSTETRICS & GYNECOLOGY

## 2024-02-28 PROCEDURE — 3074F SYST BP LT 130 MM HG: CPT | Mod: CPTII,,, | Performed by: OBSTETRICS & GYNECOLOGY

## 2024-02-28 PROCEDURE — 99999 PR PBB SHADOW E&M-EST. PATIENT-LVL II: CPT | Mod: PBBFAC,,, | Performed by: OBSTETRICS & GYNECOLOGY

## 2024-02-28 PROCEDURE — 81002 URINALYSIS NONAUTO W/O SCOPE: CPT | Mod: PBBFAC | Performed by: OBSTETRICS & GYNECOLOGY

## 2024-02-28 PROCEDURE — 99212 OFFICE O/P EST SF 10 MIN: CPT | Mod: PBBFAC | Performed by: OBSTETRICS & GYNECOLOGY

## 2024-02-28 PROCEDURE — 87088 URINE BACTERIA CULTURE: CPT | Performed by: OBSTETRICS & GYNECOLOGY

## 2024-02-28 PROCEDURE — 3008F BODY MASS INDEX DOCD: CPT | Mod: CPTII,,, | Performed by: OBSTETRICS & GYNECOLOGY

## 2024-02-28 PROCEDURE — 3078F DIAST BP <80 MM HG: CPT | Mod: CPTII,,, | Performed by: OBSTETRICS & GYNECOLOGY

## 2024-02-28 PROCEDURE — 87077 CULTURE AEROBIC IDENTIFY: CPT | Performed by: OBSTETRICS & GYNECOLOGY

## 2024-02-28 PROCEDURE — 87086 URINE CULTURE/COLONY COUNT: CPT | Performed by: OBSTETRICS & GYNECOLOGY

## 2024-02-28 PROCEDURE — 99213 OFFICE O/P EST LOW 20 MIN: CPT | Mod: S$PBB,,, | Performed by: OBSTETRICS & GYNECOLOGY

## 2024-02-28 PROCEDURE — 87186 SC STD MICRODIL/AGAR DIL: CPT | Performed by: OBSTETRICS & GYNECOLOGY

## 2024-02-28 PROCEDURE — 99999PBSHW POCT URINE DIPSTICK WITHOUT MICROSCOPE: Mod: PBBFAC,,,

## 2024-02-28 PROCEDURE — 1159F MED LIST DOCD IN RCRD: CPT | Mod: CPTII,,, | Performed by: OBSTETRICS & GYNECOLOGY

## 2024-02-28 PROCEDURE — 99999PBSHW PR PBB SHADOW TECHNICAL ONLY FILED TO HB: Mod: PBBFAC,,,

## 2024-02-28 RX ORDER — NITROFURANTOIN 25; 75 MG/1; MG/1
100 CAPSULE ORAL 2 TIMES DAILY
Qty: 14 CAPSULE | Refills: 0 | Status: SHIPPED | OUTPATIENT
Start: 2024-02-28 | End: 2024-03-06

## 2024-02-28 NOTE — PROGRESS NOTES
Subjective:    Patient ID: Jay John is a 33 y.o. y.o. female    Chief Complaint:   Chief Complaint   Patient presents with    Urinary Frequency    Hematuria       History of Present Illness:  Jay presents today for evaluation of burning and frequency with urination.  She reports no made sure abdominal or back pain.  No fever or new pair of running.  Patient states the blood in her urine has occurred about 2-3 times.      Review of Systems   Constitutional:  Negative for chills and fever.   Respiratory:  Negative for shortness of breath.    Cardiovascular:  Negative for chest pain.   Gastrointestinal:  Negative for constipation.   Genitourinary:  Positive for dysuria and frequency.   Musculoskeletal:  Negative for back pain.   Neurological:  Negative for headaches.         Objective:    Vital Signs:  Vitals:    02/28/24 0929   BP: 114/66   Pulse: 71     Wt Readings from Last 1 Encounters:   02/28/24 86.6 kg (191 lb)     Body mass index is 33.83 kg/m².    Physical Exam:  General:  alert, no distress   Skin:  Skin color, texture, turgor normal. No rashes or lesions   Abdomen:  Soft, nontender; no CVA tenderness   Extremities: No cyanosis, clubbing, edema       Urine dipstick shows negative for nitrites, glucose, protein, positive for red blood cells.     Will empirically treat for cystitis and await urine culture.  Instructed to increase fluids and reiterated importance of voiding after intercourse.  All Questions answered    I spent a total of 20 minutes on the day of the visit.  This includes face to face time and non-face to face time preparing to see the patient (eg, review of tests), obtaining and/or reviewing separately obtained history, documenting clinical information in the electronic or other health record, independently interpreting results and communicating results to the patient/family/caregiver, or care coordinator.         Assessment:      1. Dysuria    2. Hematuria, unspecified type           Plan:      Dysuria  -     POCT urine dipstick without microscope  -     nitrofurantoin, macrocrystal-monohydrate, (MACROBID) 100 MG capsule; Take 1 capsule (100 mg total) by mouth 2 (two) times daily. for 7 days  Dispense: 14 capsule; Refill: 0  -     Urine culture    Hematuria, unspecified type  -     nitrofurantoin, macrocrystal-monohydrate, (MACROBID) 100 MG capsule; Take 1 capsule (100 mg total) by mouth 2 (two) times daily. for 7 days  Dispense: 14 capsule; Refill: 0  -     Urine culture           Peri Jones MD, FACOG   02/28/2024 9:35 AM

## 2024-03-03 LAB — BACTERIA UR CULT: ABNORMAL

## 2024-08-14 ENCOUNTER — OFFICE VISIT (OUTPATIENT)
Dept: ORTHOPEDICS | Facility: CLINIC | Age: 34
End: 2024-08-14
Payer: MEDICAID

## 2024-08-14 DIAGNOSIS — M79.644 PAIN OF FINGER OF RIGHT HAND: Primary | ICD-10-CM

## 2024-08-14 PROCEDURE — 1160F RVW MEDS BY RX/DR IN RCRD: CPT | Mod: CPTII,,, | Performed by: NURSE PRACTITIONER

## 2024-08-14 PROCEDURE — 99999 PR PBB SHADOW E&M-EST. PATIENT-LVL II: CPT | Mod: PBBFAC,,, | Performed by: NURSE PRACTITIONER

## 2024-08-14 PROCEDURE — 1159F MED LIST DOCD IN RCRD: CPT | Mod: CPTII,,, | Performed by: NURSE PRACTITIONER

## 2024-08-14 PROCEDURE — 99212 OFFICE O/P EST SF 10 MIN: CPT | Mod: PBBFAC | Performed by: NURSE PRACTITIONER

## 2024-08-14 PROCEDURE — 99203 OFFICE O/P NEW LOW 30 MIN: CPT | Mod: S$PBB,,, | Performed by: NURSE PRACTITIONER

## 2024-08-14 NOTE — PROGRESS NOTES
Subjective:      Jay John is a 33 y.o. female who presents for evaluation of right ring finger pain. She is referred by IRWIN Jordan. She states onset of pain started 12/2023. She states that she was cracking her knuckles and pulled on the ring finger. She states that the finger popped and became stuck, she had immediate pain. She states that she self treated and the finger was starting to feel better over a period of a few months. She states in 06/2024, she re-injured the finger. She states that the finger has been with intermittent pain since. She rates her pain as 1/10 and is able to make a fist. She reports increased pain with gripping and feels that the finger is getting caught. She has treated with rest and OTC analgesics as needed.     Review of Systems  A comprehensive review of systems was negative.     Medical History:  Past Medical History:   Diagnosis Date    Anxiety        Surgical History:  Past Surgical History:   Procedure Laterality Date    ABSCESS DRAINAGE  2009    L leg       Family History:  Family History   Problem Relation Name Age of Onset    No Known Problems Mother      No Known Problems Father      No Known Problems Sister      No Known Problems Daughter      No Known Problems Maternal Aunt      No Known Problems Maternal Uncle      No Known Problems Paternal Aunt      No Known Problems Paternal Uncle      Ovarian cancer Maternal Grandmother      No Known Problems Maternal Grandfather      No Known Problems Paternal Grandmother      No Known Problems Paternal Grandfather      No Known Problems Other      Breast cancer Neg Hx      BRCA 1/2 Neg Hx         Allergies:   Review of patient's allergies indicates:   Allergen Reactions    Shrimp Anaphylaxis    Iodine and iodide containing products      Lip swelling          Objective:     NVI  Right hand:  Ring finger:   soft tissue tenderness at the MCP and PIP joints. No appreciable swelling.   She is able to make a fist. Pain increased with  hyperextension of the ring finger.   No laxity noted at the MCP, PIP or DIP joints.   No mallet noted. Normal pull through -both flexor and extensor tendons.    Left hand:  normal exam, no swelling, tenderness, instability; ligaments intact, full ROM both hands, wrists, and finger joints     Imaging:  X-ray  RT hand done of site and reviewed. No acute findings.      Assessment:     Sprain of RT RF     Plan:     Prior radiographs were negative.  Directed Hand exercises reviewed for ROM and strengthening, packet given. Will be done 3 x wk x 6 wks.   Buddy tape as directed. Ice as needed. OTC analgesics as previous.   RTC 6 weeks. Consider MRI of the finger if needed.   She had no further questions.

## 2024-09-25 ENCOUNTER — OFFICE VISIT (OUTPATIENT)
Dept: ORTHOPEDICS | Facility: CLINIC | Age: 34
End: 2024-09-25
Payer: MEDICAID

## 2024-09-25 DIAGNOSIS — M79.644 PAIN OF FINGER OF RIGHT HAND: Primary | ICD-10-CM

## 2024-09-25 PROCEDURE — 1160F RVW MEDS BY RX/DR IN RCRD: CPT | Mod: CPTII,,, | Performed by: NURSE PRACTITIONER

## 2024-09-25 PROCEDURE — 99211 OFF/OP EST MAY X REQ PHY/QHP: CPT | Mod: PBBFAC | Performed by: NURSE PRACTITIONER

## 2024-09-25 PROCEDURE — 99999 PR PBB SHADOW E&M-EST. PATIENT-LVL I: CPT | Mod: PBBFAC,,, | Performed by: NURSE PRACTITIONER

## 2024-09-25 PROCEDURE — 99213 OFFICE O/P EST LOW 20 MIN: CPT | Mod: S$PBB,,, | Performed by: NURSE PRACTITIONER

## 2024-09-25 PROCEDURE — 1159F MED LIST DOCD IN RCRD: CPT | Mod: CPTII,,, | Performed by: NURSE PRACTITIONER

## 2024-09-25 NOTE — PROGRESS NOTES
Subjective:      Follow up: RT RF pain     Jay John is a 33 y.o. female who presents for follow up for right ring finger pain. She presents and states that the finger has improved some since our last visit. She reports decreased swelling and improved ROM. She states that she still feels a cracking sensation with ROM. She rates her pain as 0/10 and is able to make a fist presently.      Review of Systems  A comprehensive review of systems was negative.        Medical History:       Past Medical History:   Diagnosis Date    Anxiety           Surgical History:        Past Surgical History:   Procedure Laterality Date    ABSCESS DRAINAGE   2009     L leg         Family History:         Family History   Problem Relation Name Age of Onset    No Known Problems Mother        No Known Problems Father        No Known Problems Sister        No Known Problems Daughter        No Known Problems Maternal Aunt        No Known Problems Maternal Uncle        No Known Problems Paternal Aunt        No Known Problems Paternal Uncle        Ovarian cancer Maternal Grandmother        No Known Problems Maternal Grandfather        No Known Problems Paternal Grandmother        No Known Problems Paternal Grandfather        No Known Problems Other        Breast cancer Neg Hx        BRCA 1/2 Neg Hx             Allergies:         Review of patient's allergies indicates:   Allergen Reactions    Shrimp Anaphylaxis    Iodine and iodide containing products         Lip swelling            Objective:      NVI  Right hand:  Ring finger:   soft tissue tenderness at the MCP and PIP joints is minimal.    She is able to make a fist. Mild pain with hyperextension of the ring finger.   No laxity noted at the MCP, PIP or DIP joints.   Normal pull through -both flexor and extensor tendons.    Left hand:  normal exam, no swelling, tenderness, instability; ligaments intact, full ROM both hands, wrists, and finger joints      Imaging:  None today      Assessment:      Sprain of RT RF     Plan:      She is noted with moderate improvement.  Continue the directed Hand exercises reviewed for ROM and strengthening, packet given.   Ice as needed. OTC analgesics as previous.   RTC 4 weeks for a final check. Again will consider MRI of the finger if there is any regression.    She had no further questions.

## 2024-10-28 ENCOUNTER — OFFICE VISIT (OUTPATIENT)
Dept: ORTHOPEDICS | Facility: CLINIC | Age: 34
End: 2024-10-28
Payer: MEDICAID

## 2024-10-28 DIAGNOSIS — M79.644 PAIN OF FINGER OF RIGHT HAND: Primary | ICD-10-CM

## 2024-10-28 DIAGNOSIS — R22.31 LOCALIZED SWELLING OF FINGER OF RIGHT HAND: ICD-10-CM

## 2024-10-28 PROCEDURE — 99999 PR PBB SHADOW E&M-EST. PATIENT-LVL II: CPT | Mod: PBBFAC,,, | Performed by: NURSE PRACTITIONER

## 2024-10-28 PROCEDURE — 1159F MED LIST DOCD IN RCRD: CPT | Mod: CPTII,,, | Performed by: NURSE PRACTITIONER

## 2024-10-28 PROCEDURE — 99213 OFFICE O/P EST LOW 20 MIN: CPT | Mod: S$PBB,,, | Performed by: NURSE PRACTITIONER

## 2024-10-28 PROCEDURE — 99212 OFFICE O/P EST SF 10 MIN: CPT | Mod: PBBFAC | Performed by: NURSE PRACTITIONER

## 2024-10-28 PROCEDURE — 1160F RVW MEDS BY RX/DR IN RCRD: CPT | Mod: CPTII,,, | Performed by: NURSE PRACTITIONER

## 2024-11-05 ENCOUNTER — HOSPITAL ENCOUNTER (OUTPATIENT)
Dept: RADIOLOGY | Facility: HOSPITAL | Age: 34
Discharge: HOME | End: 2024-11-05
Attending: NURSE PRACTITIONER
Payer: MEDICAID

## 2024-11-05 DIAGNOSIS — M79.644 PAIN OF FINGER OF RIGHT HAND: ICD-10-CM

## 2024-11-05 DIAGNOSIS — R22.31 LOCALIZED SWELLING OF FINGER OF RIGHT HAND: ICD-10-CM

## 2024-11-06 DIAGNOSIS — M79.644 PAIN OF FINGER OF RIGHT HAND: Primary | ICD-10-CM

## 2024-11-06 DIAGNOSIS — R22.31 LOCALIZED SWELLING OF FINGER OF RIGHT HAND: ICD-10-CM

## 2024-11-11 DIAGNOSIS — M79.644 PAIN OF FINGER OF RIGHT HAND: Primary | ICD-10-CM

## 2024-11-11 DIAGNOSIS — R22.31 LOCALIZED SWELLING OF FINGER OF RIGHT HAND: ICD-10-CM

## 2024-11-11 RX ORDER — DIAZEPAM 10 MG/1
10 TABLET ORAL
Qty: 1 TABLET | Refills: 0 | Status: SHIPPED | OUTPATIENT
Start: 2024-11-11 | End: 2024-12-11

## 2024-12-09 ENCOUNTER — HOSPITAL ENCOUNTER (EMERGENCY)
Facility: HOSPITAL | Age: 34
Discharge: HOME | End: 2024-12-09
Attending: EMERGENCY MEDICINE
Payer: MEDICAID

## 2024-12-09 VITALS
WEIGHT: 190 LBS | DIASTOLIC BLOOD PRESSURE: 86 MMHG | HEART RATE: 76 BPM | TEMPERATURE: 98 F | BODY MASS INDEX: 33.66 KG/M2 | SYSTOLIC BLOOD PRESSURE: 123 MMHG | OXYGEN SATURATION: 99 % | RESPIRATION RATE: 16 BRPM | HEIGHT: 63 IN

## 2024-12-09 DIAGNOSIS — R00.2 PALPITATIONS: Primary | ICD-10-CM

## 2024-12-09 DIAGNOSIS — R07.9 CHEST PAIN, UNSPECIFIED TYPE: ICD-10-CM

## 2024-12-09 LAB
ALBUMIN SERPL-MCNC: 3.8 G/DL (ref 3.5–5)
ALBUMIN/GLOB SERPL: 1.1 RATIO (ref 1.1–2)
ALP SERPL-CCNC: 48 UNIT/L (ref 40–150)
ALT SERPL-CCNC: 14 UNIT/L (ref 0–55)
ANION GAP SERPL CALC-SCNC: 6 MEQ/L
AST SERPL-CCNC: 22 UNIT/L (ref 5–34)
B-HCG UR QL: NEGATIVE
BACTERIA #/AREA URNS AUTO: ABNORMAL /HPF
BASOPHILS # BLD AUTO: 0.03 X10(3)/MCL
BASOPHILS NFR BLD AUTO: 0.4 %
BILIRUB SERPL-MCNC: 0.5 MG/DL
BILIRUB UR QL STRIP.AUTO: NEGATIVE
BUN SERPL-MCNC: 11 MG/DL (ref 7–18.7)
CALCIUM SERPL-MCNC: 8.9 MG/DL (ref 8.4–10.2)
CHLORIDE SERPL-SCNC: 101 MMOL/L (ref 98–107)
CLARITY UR: CLEAR
CO2 SERPL-SCNC: 30 MMOL/L (ref 22–29)
COLOR UR AUTO: COLORLESS
CREAT SERPL-MCNC: 0.73 MG/DL (ref 0.55–1.02)
CREAT/UREA NIT SERPL: 15
EOSINOPHIL # BLD AUTO: 0.03 X10(3)/MCL (ref 0–0.9)
EOSINOPHIL NFR BLD AUTO: 0.4 %
ERYTHROCYTE [DISTWIDTH] IN BLOOD BY AUTOMATED COUNT: 12.8 % (ref 11.5–17)
GFR SERPLBLD CREATININE-BSD FMLA CKD-EPI: >60 ML/MIN/1.73/M2
GLOBULIN SER-MCNC: 3.5 GM/DL (ref 2.4–3.5)
GLUCOSE SERPL-MCNC: 90 MG/DL (ref 74–100)
GLUCOSE UR QL STRIP: NORMAL
HCT VFR BLD AUTO: 39.6 % (ref 37–47)
HGB BLD-MCNC: 13.2 G/DL (ref 12–16)
HGB UR QL STRIP: NEGATIVE
IMM GRANULOCYTES # BLD AUTO: 0.03 X10(3)/MCL (ref 0–0.04)
IMM GRANULOCYTES NFR BLD AUTO: 0.4 %
KETONES UR QL STRIP: NEGATIVE
LEUKOCYTE ESTERASE UR QL STRIP: 75
LYMPHOCYTES # BLD AUTO: 2.12 X10(3)/MCL (ref 0.6–4.6)
LYMPHOCYTES NFR BLD AUTO: 26.6 %
MAGNESIUM SERPL-MCNC: 1.8 MG/DL (ref 1.6–2.6)
MCH RBC QN AUTO: 30.5 PG (ref 27–31)
MCHC RBC AUTO-ENTMCNC: 33.3 G/DL (ref 33–36)
MCV RBC AUTO: 91.5 FL (ref 80–94)
MONOCYTES # BLD AUTO: 0.4 X10(3)/MCL (ref 0.1–1.3)
MONOCYTES NFR BLD AUTO: 5 %
NEUTROPHILS # BLD AUTO: 5.36 X10(3)/MCL (ref 2.1–9.2)
NEUTROPHILS NFR BLD AUTO: 67.2 %
NITRITE UR QL STRIP: NEGATIVE
NRBC BLD AUTO-RTO: 0 %
OHS QRS DURATION: 72 MS
OHS QTC CALCULATION: 490 MS
PH UR STRIP: 7.5 [PH]
PLATELET # BLD AUTO: 240 X10(3)/MCL (ref 130–400)
PMV BLD AUTO: 10 FL (ref 7.4–10.4)
POTASSIUM SERPL-SCNC: 4.2 MMOL/L (ref 3.5–5.1)
PROT SERPL-MCNC: 7.3 GM/DL (ref 6.4–8.3)
PROT UR QL STRIP: NEGATIVE
RBC # BLD AUTO: 4.33 X10(6)/MCL (ref 4.2–5.4)
RBC #/AREA URNS AUTO: ABNORMAL /HPF
SODIUM SERPL-SCNC: 137 MMOL/L (ref 136–145)
SP GR UR STRIP.AUTO: 1.02 (ref 1–1.03)
SQUAMOUS #/AREA URNS LPF: ABNORMAL /HPF
T4 FREE SERPL-MCNC: 0.95 NG/DL (ref 0.7–1.48)
TROPONIN I SERPL-MCNC: <0.01 NG/ML (ref 0–0.04)
TROPONIN I SERPL-MCNC: <0.01 NG/ML (ref 0–0.04)
TSH SERPL-ACNC: 0.97 UIU/ML (ref 0.35–4.94)
UROBILINOGEN UR STRIP-ACNC: NORMAL
WBC # BLD AUTO: 7.97 X10(3)/MCL (ref 4.5–11.5)
WBC #/AREA URNS AUTO: ABNORMAL /HPF

## 2024-12-09 PROCEDURE — 99284 EMERGENCY DEPT VISIT MOD MDM: CPT | Mod: 25

## 2024-12-09 PROCEDURE — 93010 ELECTROCARDIOGRAM REPORT: CPT | Mod: ,,, | Performed by: STUDENT IN AN ORGANIZED HEALTH CARE EDUCATION/TRAINING PROGRAM

## 2024-12-09 PROCEDURE — 84439 ASSAY OF FREE THYROXINE: CPT | Performed by: NURSE PRACTITIONER

## 2024-12-09 PROCEDURE — 81025 URINE PREGNANCY TEST: CPT | Performed by: NURSE PRACTITIONER

## 2024-12-09 PROCEDURE — 81001 URINALYSIS AUTO W/SCOPE: CPT | Performed by: NURSE PRACTITIONER

## 2024-12-09 PROCEDURE — 84443 ASSAY THYROID STIM HORMONE: CPT | Performed by: NURSE PRACTITIONER

## 2024-12-09 PROCEDURE — 84484 ASSAY OF TROPONIN QUANT: CPT | Performed by: NURSE PRACTITIONER

## 2024-12-09 PROCEDURE — 93005 ELECTROCARDIOGRAM TRACING: CPT

## 2024-12-09 PROCEDURE — 80053 COMPREHEN METABOLIC PANEL: CPT | Performed by: NURSE PRACTITIONER

## 2024-12-09 PROCEDURE — 83735 ASSAY OF MAGNESIUM: CPT | Performed by: NURSE PRACTITIONER

## 2024-12-09 PROCEDURE — 85025 COMPLETE CBC W/AUTO DIFF WBC: CPT | Performed by: NURSE PRACTITIONER

## 2024-12-09 NOTE — DISCHARGE INSTRUCTIONS
Follow up with your primary care provider.  If you experience any new or worsening symptoms return to the emergency department.

## 2024-12-09 NOTE — Clinical Note
"Jay Carrillo" Alvaro was seen and treated in our emergency department on 12/9/2024.  She may return to work on 12/10/2024.       If you have any questions or concerns, please don't hesitate to call.      TAWNYA ZEPEDA RN    "

## 2024-12-09 NOTE — ED PROVIDER NOTES
Encounter Date: 12/9/2024       History     Chief Complaint   Patient presents with    Dizziness     Reports palpitations, dizziness & chest pain while driving today. Reports symptoms resolved prior to arrival. Denies cardiac hx. Hx anxiety     See mdm    The history is provided by the patient. No  was used.     Review of patient's allergies indicates:   Allergen Reactions    Shrimp Anaphylaxis    Iodine and iodide containing products      Lip swelling     Past Medical History:   Diagnosis Date    Anxiety      Past Surgical History:   Procedure Laterality Date    ABSCESS DRAINAGE  2009    L leg     Family History   Problem Relation Name Age of Onset    No Known Problems Mother      No Known Problems Father      No Known Problems Sister      No Known Problems Daughter      No Known Problems Maternal Aunt      No Known Problems Maternal Uncle      No Known Problems Paternal Aunt      No Known Problems Paternal Uncle      Ovarian cancer Maternal Grandmother      No Known Problems Maternal Grandfather      No Known Problems Paternal Grandmother      No Known Problems Paternal Grandfather      No Known Problems Other      Breast cancer Neg Hx      BRCA 1/2 Neg Hx       Social History     Tobacco Use    Smoking status: Never    Smokeless tobacco: Never   Substance Use Topics    Alcohol use: Not Currently    Drug use: Never     Review of Systems   Cardiovascular:  Positive for chest pain and palpitations.   Neurological:  Positive for dizziness.   All other systems reviewed and are negative.      Physical Exam     Initial Vitals [12/09/24 1151]   BP Pulse Resp Temp SpO2   136/87 72 16 98.3 °F (36.8 °C) 100 %      MAP       --         Physical Exam    Nursing note and vitals reviewed.  Constitutional: She appears well-developed and well-nourished.   HENT:   Head: Normocephalic and atraumatic.   Eyes: EOM are normal.   Neck:   Normal range of motion.  Cardiovascular:  Normal rate and regular rhythm.            Pulmonary/Chest: Breath sounds normal.   Musculoskeletal:         General: Normal range of motion.      Cervical back: Normal range of motion.     Neurological: She is alert and oriented to person, place, and time.   Skin: Skin is warm and dry.   Psychiatric: She has a normal mood and affect.         ED Course   Procedures  Labs Reviewed   COMPREHENSIVE METABOLIC PANEL - Abnormal       Result Value    Sodium 137      Potassium 4.2      Chloride 101      CO2 30 (*)     Glucose 90      Blood Urea Nitrogen 11.0      Creatinine 0.73      Calcium 8.9      Protein Total 7.3      Albumin 3.8      Globulin 3.5      Albumin/Globulin Ratio 1.1      Bilirubin Total 0.5      ALP 48      ALT 14      AST 22      eGFR >60      Anion Gap 6.0      BUN/Creatinine Ratio 15     URINALYSIS, REFLEX TO URINE CULTURE - Abnormal    Color, UA Colorless      Appearance, UA Clear      Specific Gravity, UA 1.016      pH, UA 7.5      Protein, UA Negative      Glucose, UA Normal      Ketones, UA Negative      Blood, UA Negative      Bilirubin, UA Negative      Urobilinogen, UA Normal      Nitrites, UA Negative      Leukocyte Esterase, UA 75 (*)     RBC, UA 0-5      WBC, UA 0-5      Bacteria, UA None Seen      Squamous Epithelial Cells, UA Trace     TROPONIN I - Normal    Troponin-I <0.010     PREGNANCY TEST, URINE RAPID - Normal    hCG Qualitative, Urine Negative     MAGNESIUM - Normal    Magnesium Level 1.80     TSH - Normal    TSH 0.969     T4, FREE - Normal    Thyroxine Free 0.95     TROPONIN I - Normal    Troponin-I <0.010     CBC WITH DIFFERENTIAL    WBC 7.97      RBC 4.33      Hgb 13.2      Hct 39.6      MCV 91.5      MCH 30.5      MCHC 33.3      RDW 12.8      Platelet 240      MPV 10.0      Neut % 67.2      Lymph % 26.6      Mono % 5.0      Eos % 0.4      Basophil % 0.4      Lymph # 2.12      Neut # 5.36      Mono # 0.40      Eos # 0.03      Baso # 0.03      IG# 0.03      IG% 0.4      NRBC% 0.0       EKG Readings: (Independently  "Interpreted)   Initial Reading: No STEMI. Rhythm: Normal Sinus Rhythm. Heart Rate: 86. Ectopy: No Ectopy. Conduction: Normal. ST Segments: Normal ST Segments. T Waves Flipped: V1 and V2. Axis: Normal.       Imaging Results    None          Medications - No data to display  Medical Decision Making  The patient is a 33 y.o. female with a pertinent PMHX of anxiety who presents to the Emergency Department with no one with a chief complaint of heart palpitations, dizziness, and "weird feeling". Symptoms began this morning while driving and lasted for 5 minutes.  She denies any symptoms at this time. Associated symptoms included chest tightness. Symptoms are aggravated with nothing and alleviated with nothing. The patient denies cardiac history, nausea, vomiting, fever, sick contacts, LOC, drug use. They report taking nothing prior to arrival. No other reported symptoms at this time.  Patient states the only change in medication she has had recently is that she has been taking omeprazole consistently for 3 weeks now.    Pertinent physical exam findings include nothing.  Vital signs stable.    UA without signs of acute infection.  UPT negative CBC with no signs of acute infection or anemia.  CMP with no emergent findings.  Troponin less than 0.01.  Mg WNL.  TSH WNL.  T4 WNL.  EKG NSR.  Repeat troponin negative.    Laboratory findings discussed with patient.  Patient verbalized understanding and agreement of plan.  Discharge instructions and return precautions provided.  All questions answered.      Amount and/or Complexity of Data Reviewed  Labs: ordered. Decision-making details documented in ED Course.  ECG/medicine tests: ordered and independent interpretation performed. Decision-making details documented in ED Course.      Additional MDM:   Differential Diagnosis:   Symptom: Chest pain. <> The follow diagnoses were considered and will be evaluated: Acute MI, Angina Pectoris and ST Elevation MI.   Other: The following " diagnoses were also considered and will be evaluated: Anxiety.    Heart Score:    History:          Moderately suspicious.  ECG:             Normal  Age:               Less than 45 years  Risk factors: no risk factors known  Troponin:       Less than or equal to normal limit  Heart Score = 1                                       Clinical Impression:  Final diagnoses:  [R00.2] Palpitations (Primary)  [R07.9] Chest pain, unspecified type          ED Disposition Condition    Discharge Stable          ED Prescriptions    None       Follow-up Information       Follow up With Specialties Details Why Contact Info    [facility], Speech Pathology Call in 1 week As needed [address] [facility] [address]  [phone]               Anne Marie Negron PA-C  12/09/24 9734

## 2024-12-09 NOTE — FIRST PROVIDER EVALUATION
Medical screening examination initiated.  I have conducted a focused provider triage encounter, findings are as follows:    Brief history of present illness:  34 y/o female who presents with having episode today while driving where her heart was racing she felt dizzy and arm felt numb. She states it lasted approx 10 min. She states after she had some chest pain and she belched.     There were no vitals filed for this visit.    Pertinent physical exam:  alert, nonlabored, ambulatory     Brief workup plan:  EKG, labs, urine    Preliminary workup initiated; this workup will be continued and followed by the physician or advanced practice provider that is assigned to the patient when roomed.

## 2024-12-17 DIAGNOSIS — Z30.41 SURVEILLANCE OF CONTRACEPTIVE PILL: ICD-10-CM

## 2024-12-17 RX ORDER — NORGESTIMATE AND ETHINYL ESTRADIOL 0.25-0.035
1 KIT ORAL
Qty: 28 TABLET | Refills: 0 | Status: SHIPPED | OUTPATIENT
Start: 2024-12-17

## 2024-12-18 ENCOUNTER — OFFICE VISIT (OUTPATIENT)
Dept: ORTHOPEDICS | Facility: CLINIC | Age: 34
End: 2024-12-18
Payer: MEDICAID

## 2024-12-18 DIAGNOSIS — R22.31 LOCALIZED SWELLING OF FINGER OF RIGHT HAND: ICD-10-CM

## 2024-12-18 DIAGNOSIS — M79.644 PAIN OF FINGER OF RIGHT HAND: Primary | ICD-10-CM

## 2024-12-18 PROCEDURE — 99211 OFF/OP EST MAY X REQ PHY/QHP: CPT | Mod: PBBFAC | Performed by: NURSE PRACTITIONER

## 2024-12-18 PROCEDURE — 1159F MED LIST DOCD IN RCRD: CPT | Mod: CPTII,,, | Performed by: NURSE PRACTITIONER

## 2024-12-18 PROCEDURE — 1160F RVW MEDS BY RX/DR IN RCRD: CPT | Mod: CPTII,,, | Performed by: NURSE PRACTITIONER

## 2024-12-18 PROCEDURE — 99999 PR PBB SHADOW E&M-EST. PATIENT-LVL I: CPT | Mod: PBBFAC,,, | Performed by: NURSE PRACTITIONER

## 2024-12-18 PROCEDURE — 99213 OFFICE O/P EST LOW 20 MIN: CPT | Mod: S$PBB,,, | Performed by: NURSE PRACTITIONER

## 2024-12-18 NOTE — PROGRESS NOTES
Subjective:      Follow up: RT hand MRI: RF pain     Jay John is a 34 y.o. female presents for follow up for right ring finger and hand pain. She is here for MRI review. She presents and states that the hand/ ring finger has been intermittent. She reports intermittent painful ROM of the RF pain radiates into the hand. She states that she still feels a cracking/catching sensation with ROM as previous. She rates her pain as 2/10. She is not presently having locking but again states that her RF feels lock at times.      Review of Systems  A comprehensive review of systems was negative.        Medical History:          Past Medical History:   Diagnosis Date    Anxiety           Surgical History:            Past Surgical History:   Procedure Laterality Date    ABSCESS DRAINAGE   2009     L leg         Family History:              Family History   Problem Relation Name Age of Onset    No Known Problems Mother        No Known Problems Father        No Known Problems Sister        No Known Problems Daughter        No Known Problems Maternal Aunt        No Known Problems Maternal Uncle        No Known Problems Paternal Aunt        No Known Problems Paternal Uncle        Ovarian cancer Maternal Grandmother        No Known Problems Maternal Grandfather        No Known Problems Paternal Grandmother        No Known Problems Paternal Grandfather        No Known Problems Other        Breast cancer Neg Hx        BRCA 1/2 Neg Hx             Allergies:             Review of patient's allergies indicates:   Allergen Reactions    Shrimp Anaphylaxis    Iodine and iodide containing products         Lip swelling            Objective:      NVI  Right hand:  Ring finger:   soft tissue tenderness at the MCP and PIP joint. Pain radiates in to the dorsal hand.   She is able to make a fist  Mild pain with hyperextension of the ring finger as previous.   No laxity noted.   Normal pull through -both flexor and extensor tendons.   No  triggering or locking.   Left hand:  normal exam, no swelling, tenderness, instability; ligaments intact, full ROM both hands, wrists, and finger joints      Imaging:  None today    MRI RT Hand was done off site reviewed today:   Mild joint space narrowing of the 2nd through 5th DIP joints and mild narrowing of the radiocarpal compartment is likely degenerative in nature.  No fractures seen.  Tendinous and ligament structures appear intact.       Assessment:      RT hand and RT RF pain  Mild DJD hand      Plan:      MRI RT Hand was reviewed.Consistent with mild DJD changes with acute findings.   Discussed referral to hand specialty, continued RF and hand pain- unknown etiology.    Continue the directed Hand exercises reviewed for ROM and strengthening as previous.   Ice as needed. OTC analgesics as previous.   RTC prn.  She had no further questions.

## 2025-01-10 DIAGNOSIS — Z30.41 SURVEILLANCE OF CONTRACEPTIVE PILL: ICD-10-CM

## 2025-01-10 RX ORDER — NORGESTIMATE AND ETHINYL ESTRADIOL 0.25-0.035
1 KIT ORAL
Qty: 28 TABLET | Refills: 0 | Status: SHIPPED | OUTPATIENT
Start: 2025-01-10

## 2025-02-04 ENCOUNTER — OFFICE VISIT (OUTPATIENT)
Dept: OBSTETRICS AND GYNECOLOGY | Facility: CLINIC | Age: 35
End: 2025-02-04
Payer: MEDICAID

## 2025-02-04 VITALS
HEIGHT: 63 IN | BODY MASS INDEX: 34.91 KG/M2 | DIASTOLIC BLOOD PRESSURE: 59 MMHG | HEART RATE: 79 BPM | SYSTOLIC BLOOD PRESSURE: 119 MMHG | WEIGHT: 197 LBS

## 2025-02-04 DIAGNOSIS — Z12.4 SCREENING FOR MALIGNANT NEOPLASM OF THE CERVIX: ICD-10-CM

## 2025-02-04 DIAGNOSIS — Z30.41 SURVEILLANCE OF CONTRACEPTIVE PILL: ICD-10-CM

## 2025-02-04 DIAGNOSIS — Z01.419 ENCOUNTER FOR ANNUAL ROUTINE GYNECOLOGICAL EXAMINATION: Primary | ICD-10-CM

## 2025-02-04 PROCEDURE — 3008F BODY MASS INDEX DOCD: CPT | Mod: CPTII,,, | Performed by: OBSTETRICS & GYNECOLOGY

## 2025-02-04 PROCEDURE — 99999 PR PBB SHADOW E&M-EST. PATIENT-LVL III: CPT | Mod: PBBFAC,,, | Performed by: OBSTETRICS & GYNECOLOGY

## 2025-02-04 PROCEDURE — 3074F SYST BP LT 130 MM HG: CPT | Mod: CPTII,,, | Performed by: OBSTETRICS & GYNECOLOGY

## 2025-02-04 PROCEDURE — 3078F DIAST BP <80 MM HG: CPT | Mod: CPTII,,, | Performed by: OBSTETRICS & GYNECOLOGY

## 2025-02-04 PROCEDURE — 1159F MED LIST DOCD IN RCRD: CPT | Mod: CPTII,,, | Performed by: OBSTETRICS & GYNECOLOGY

## 2025-02-04 PROCEDURE — 1160F RVW MEDS BY RX/DR IN RCRD: CPT | Mod: CPTII,,, | Performed by: OBSTETRICS & GYNECOLOGY

## 2025-02-04 PROCEDURE — 99213 OFFICE O/P EST LOW 20 MIN: CPT | Mod: PBBFAC | Performed by: OBSTETRICS & GYNECOLOGY

## 2025-02-04 PROCEDURE — 99395 PREV VISIT EST AGE 18-39: CPT | Mod: S$PBB,,, | Performed by: OBSTETRICS & GYNECOLOGY

## 2025-02-04 RX ORDER — OMEPRAZOLE 40 MG/1
40 CAPSULE, DELAYED RELEASE ORAL
COMMUNITY
Start: 2024-11-24

## 2025-02-04 RX ORDER — CITALOPRAM 10 MG/1
10 TABLET ORAL
COMMUNITY
Start: 2024-12-18

## 2025-02-04 RX ORDER — HYDROXYZINE PAMOATE 25 MG/1
25 CAPSULE ORAL NIGHTLY PRN
COMMUNITY
Start: 2024-12-18

## 2025-02-04 RX ORDER — LORATADINE 10 MG/1
10 TABLET ORAL
COMMUNITY
Start: 2024-11-02

## 2025-02-04 RX ORDER — NORGESTIMATE AND ETHINYL ESTRADIOL 0.25-0.035
1 KIT ORAL DAILY
Qty: 28 TABLET | Refills: 12 | Status: SHIPPED | OUTPATIENT
Start: 2025-02-04

## 2025-02-04 NOTE — PROGRESS NOTES
"SUBJECTIVE:   34 y.o. female for annual routine Pap and checkup. No complaints today. Happy with OCP's thus far and needs refills  Current Outpatient Medications   Medication Sig Dispense Refill    citalopram (CELEXA) 10 MG tablet Take 10 mg by mouth.      fluticasone propionate (FLONASE) 50 mcg/actuation nasal spray 1 spray (50 mcg total) by Each Nostril route 2 (two) times daily as needed for Rhinitis. 16 g 0    hydrOXYzine pamoate (VISTARIL) 25 MG Cap Take 25 mg by mouth nightly as needed.      loratadine (CLARITIN) 10 mg tablet Take 10 mg by mouth.      omeprazole (PRILOSEC) 40 MG capsule Take 40 mg by mouth.      diazePAM (VALIUM) 10 MG Tab Take 1 tablet (10 mg total) by mouth On call Procedure (30 mins before procedure). (Patient not taking: Reported on 2/4/2025) 1 tablet 0    norgestimate-ethinyl estradioL (SPRINTEC, 28,) 0.25-35 mg-mcg per tablet Take 1 tablet by mouth once daily. 28 tablet 12     No current facility-administered medications for this visit.     Allergies: Shrimp and Iodine and iodide containing products   Patient's last menstrual period was 01/22/2025 (exact date).    ROS:  Feeling well. No dyspnea or chest pain on exertion.  No abdominal pain, change in bowel habits, black or bloody stools.  No urinary tract symptoms. GYN ROS: normal menses, no abnormal bleeding, pelvic pain or discharge, no breast pain or new or enlarging lumps on self exam. No neurological complaints.    OBJECTIVE:   The patient appears well, alert, oriented x 3, in no distress.  BP (!) 119/59   Pulse 79   Ht 5' 3" (1.6 m)   Wt 89.4 kg (197 lb)   LMP 01/22/2025 (Exact Date)   BMI 34.90 kg/m²   ENT normal.  Neck supple. No adenopathy or thyromegaly. DEE. Lungs are clear, good air entry, no wheezes, rhonchi or rales. S1 and S2 normal, no murmurs, regular rate and rhythm. Abdomen soft without tenderness, guarding, mass or organomegaly. Extremities show no edema, normal peripheral pulses. Neurological is normal, no " focal findings.    BREAST EXAM: breasts appear normal, no suspicious masses, no skin or nipple changes or axillary nodes    PELVIC EXAM: VULVA: normal appearing vulva with no masses, tenderness or lesions, VAGINA: normal appearing vagina with normal color and discharge, no lesions, CERVIX: normal appearing cervix without discharge or lesions, UTERUS: uterus is normal size, shape, consistency and nontender, ADNEXA: normal adnexa in size, nontender and no masses, PAP: Pap smear done today, thin-prep method    ASSESSMENT:   well woman  no contraindication to continue use of oral contraceptives    PLAN:   pap smear  counseled on use and side effects of OCP's and adequate intake of calcium and vitamin D  return annually or prn

## 2025-02-10 LAB
HPV16+18+H RISK 12 DNA CVX-IMP: NORMAL
LIQUID BASED PAP SMEAR, SCREENING: NORMAL

## 2025-05-19 ENCOUNTER — OFFICE VISIT (OUTPATIENT)
Dept: URGENT CARE | Facility: CLINIC | Age: 35
End: 2025-05-19
Payer: MEDICAID

## 2025-05-19 VITALS
RESPIRATION RATE: 18 BRPM | SYSTOLIC BLOOD PRESSURE: 127 MMHG | TEMPERATURE: 99 F | OXYGEN SATURATION: 97 % | BODY MASS INDEX: 34.91 KG/M2 | DIASTOLIC BLOOD PRESSURE: 73 MMHG | WEIGHT: 197 LBS | HEART RATE: 83 BPM | HEIGHT: 63 IN

## 2025-05-19 DIAGNOSIS — R09.81 SINUS CONGESTION: Primary | ICD-10-CM

## 2025-05-19 DIAGNOSIS — U07.1 COVID-19: ICD-10-CM

## 2025-05-19 LAB
CTP QC/QA: YES
MOLECULAR STREP A: NEGATIVE
POC MOLECULAR INFLUENZA A AGN: NEGATIVE
POC MOLECULAR INFLUENZA B AGN: NEGATIVE
SARS-COV+SARS-COV-2 AG RESP QL IA.RAPID: POSITIVE

## 2025-05-19 PROCEDURE — 87651 STREP A DNA AMP PROBE: CPT | Mod: QW,,, | Performed by: NURSE PRACTITIONER

## 2025-05-19 PROCEDURE — 87502 INFLUENZA DNA AMP PROBE: CPT | Mod: QW,,, | Performed by: NURSE PRACTITIONER

## 2025-05-19 PROCEDURE — 87811 SARS-COV-2 COVID19 W/OPTIC: CPT | Mod: QW,,, | Performed by: NURSE PRACTITIONER

## 2025-05-19 PROCEDURE — 99204 OFFICE O/P NEW MOD 45 MIN: CPT | Mod: ,,, | Performed by: NURSE PRACTITIONER

## 2025-05-19 RX ORDER — BETAMETHASONE SODIUM PHOSPHATE AND BETAMETHASONE ACETATE 3; 3 MG/ML; MG/ML
6 INJECTION, SUSPENSION INTRA-ARTICULAR; INTRALESIONAL; INTRAMUSCULAR; SOFT TISSUE
Status: COMPLETED | OUTPATIENT
Start: 2025-05-19 | End: 2025-05-19

## 2025-05-19 RX ADMIN — BETAMETHASONE SODIUM PHOSPHATE AND BETAMETHASONE ACETATE 6 MG: 3; 3 INJECTION, SUSPENSION INTRA-ARTICULAR; INTRALESIONAL; INTRAMUSCULAR; SOFT TISSUE at 12:05

## 2025-05-19 NOTE — PROGRESS NOTES
"Subjective:      Patient ID: Jay John is a 34 y.o. female.    Vitals:  height is 5' 3" (1.6 m) and weight is 89.4 kg (197 lb). Her temperature is 98.6 °F (37 °C). Her blood pressure is 127/73 and her pulse is 83. Her respiration is 18 and oxygen saturation is 97%.     Chief Complaint: Sinus Problem     Patient is a 34 y.o. female who presents to urgent care with complaints of ST, sinus congestion, headaches, and fatigue x4 days.     Sinus Problem  Associated symptoms include congestion and a sore throat.     Constitution: Positive for fatigue.   HENT:  Positive for congestion and sore throat.       Objective:     Physical Exam   Constitutional: She is oriented to person, place, and time. She appears well-developed. She is cooperative.  Non-toxic appearance. She does not appear ill. No distress.   HENT:   Head: Normocephalic and atraumatic.   Ears:   Right Ear: Hearing, tympanic membrane, external ear and ear canal normal.   Left Ear: Hearing, tympanic membrane, external ear and ear canal normal.   Nose: Nose normal. No mucosal edema, rhinorrhea or nasal deformity. No epistaxis. Right sinus exhibits no maxillary sinus tenderness and no frontal sinus tenderness. Left sinus exhibits no maxillary sinus tenderness and no frontal sinus tenderness.   Mouth/Throat: Uvula is midline, oropharynx is clear and moist and mucous membranes are normal. No trismus in the jaw. Normal dentition. No uvula swelling. No oropharyngeal exudate, posterior oropharyngeal edema or posterior oropharyngeal erythema.   Eyes: Conjunctivae and lids are normal. No scleral icterus.   Neck: Trachea normal and phonation normal. Neck supple. No edema present. No erythema present. No neck rigidity present.   Cardiovascular: Normal rate, regular rhythm, normal heart sounds and normal pulses.   Pulmonary/Chest: Effort normal and breath sounds normal. No respiratory distress. She has no decreased breath sounds. She has no rhonchi.   Abdominal: " Normal appearance.   Musculoskeletal: Normal range of motion.         General: No deformity. Normal range of motion.   Neurological: She is alert and oriented to person, place, and time. She exhibits normal muscle tone. Coordination normal.   Skin: Skin is warm, dry, intact, not diaphoretic and not pale.   Psychiatric: Her speech is normal and behavior is normal. Judgment and thought content normal.   Nursing note and vitals reviewed.    Previous History:     Review of patient's allergies indicates:   Allergen Reactions    Shrimp Anaphylaxis    Iodine and iodide containing products      Lip swelling       Past Medical History:   Diagnosis Date    Anxiety     Sleep apnea, unspecified      Current Outpatient Medications   Medication Instructions    citalopram (CELEXA) 10 mg    diazePAM (VALIUM) 10 mg, Oral, On Call Procedure    fluticasone propionate (FLONASE) 50 mcg, Each Nostril, 2 times daily PRN    hydrOXYzine pamoate (VISTARIL) 25 mg, Nightly PRN    loratadine (CLARITIN) 10 mg    norgestimate-ethinyl estradioL (SPRINTEC, 28,) 0.25-35 mg-mcg per tablet 1 tablet, Oral, Daily    omeprazole (PRILOSEC) 40 mg     Past Surgical History:   Procedure Laterality Date    ABSCESS DRAINAGE  2009    L leg     Family History   Problem Relation Name Age of Onset    No Known Problems Mother      No Known Problems Father      No Known Problems Sister      No Known Problems Daughter      No Known Problems Maternal Aunt      No Known Problems Maternal Uncle      No Known Problems Paternal Aunt      No Known Problems Paternal Uncle      Ovarian cancer Maternal Grandmother      No Known Problems Maternal Grandfather      No Known Problems Paternal Grandmother      No Known Problems Paternal Grandfather      No Known Problems Other      Breast cancer Neg Hx      BRCA 1/2 Neg Hx         Social History[1]  Assessment:     1. Sinus congestion    2. COVID-19      Office Visit on 05/19/2025   Component Date Value Ref Range Status    POC  Molecular Influenza A Ag 05/19/2025 Negative  Negative Final    POC Molecular Influenza B Ag 05/19/2025 Negative  Negative Final     Acceptable 05/19/2025 Yes   Final    Molecular Strep A, POC 05/19/2025 Negative  Negative Final     Acceptable 05/19/2025 Yes   Final    SARS Coronavirus 2 Antigen 05/19/2025 Positive (A)  Negative, Presumptive Negative Final     Acceptable 05/19/2025 Yes   Final       Plan:   Take Mucinex as directed for cough.   Increase oral fluids, take daily vitamins as directed.  The CDC guidelines have changed.  The recommendations suggest returning to normal activities when, for at least 24 hours, symptoms are improving overall, and if a fever was present, it has been gone without use of a fever-reducing medication. Example Tylenol or ibuprofen.  Once people resume normal activities, they are encouraged to take additional prevention strategies for the next 5 days to curb disease spread, such as taking more steps for  air, enhancing hygiene practices, wearing a well-fitting mask, keeping a distance from others, and/or getting tested for respiratory viruses.   Enhanced precautions are especially important to protect those most at risk for severe illness, including those over 65 and people with weakened immune systems.  Go to ER for worsening symptoms including shortness of breath, fever, or worsening symptoms.         Sinus congestion  -     POCT Influenza A/B Molecular  -     POCT Strep A, Molecular  -     SARS Coronavirus 2 Antigen, POCT Manual Read  -     betamethasone acetate-betamethasone sodium phosphate injection 6 mg    COVID-19                         [1]   Social History  Tobacco Use    Smoking status: Never    Smokeless tobacco: Never   Substance Use Topics    Alcohol use: Not Currently    Drug use: Never

## 2025-05-19 NOTE — LETTER
May 19, 2025      [facility] [address] [address]  [address], [address]  [place] [address]  Phone: [phone]  Fax: [phone]       Patient: Jay John   YOB: 1990  Date of Visit: 05/19/2025    To Whom It May Concern:    Ismael John  was at [facility] on 05/19/2025. The patient may return to work on 05/21/2025 with no restrictions. If you have any questions or concerns, or if I can be of further assistance, please do not hesitate to contact me.    Sincerely,    Carole Fields LPN

## 2025-06-07 ENCOUNTER — OFFICE VISIT (OUTPATIENT)
Dept: URGENT CARE | Facility: CLINIC | Age: 35
End: 2025-06-07
Payer: MEDICAID

## 2025-06-07 VITALS
WEIGHT: 197 LBS | BODY MASS INDEX: 34.91 KG/M2 | RESPIRATION RATE: 18 BRPM | OXYGEN SATURATION: 100 % | DIASTOLIC BLOOD PRESSURE: 81 MMHG | TEMPERATURE: 99 F | HEART RATE: 72 BPM | HEIGHT: 63 IN | SYSTOLIC BLOOD PRESSURE: 126 MMHG

## 2025-06-07 DIAGNOSIS — R30.0 DYSURIA: Primary | ICD-10-CM

## 2025-06-07 DIAGNOSIS — N30.00 ACUTE CYSTITIS WITHOUT HEMATURIA: ICD-10-CM

## 2025-06-07 LAB
B-HCG UR QL: NEGATIVE
BILIRUB UR QL STRIP: POSITIVE
CTP QC/QA: YES
GLUCOSE UR QL STRIP: NEGATIVE
KETONES UR QL STRIP: NEGATIVE
LEUKOCYTE ESTERASE UR QL STRIP: POSITIVE
PH, POC UA: 6.5
POC BLOOD, URINE: NEGATIVE
POC NITRATES, URINE: NEGATIVE
PROT UR QL STRIP: POSITIVE
SP GR UR STRIP: 1.01 (ref 1–1.03)
UROBILINOGEN UR STRIP-ACNC: 2 (ref 0.1–1.1)

## 2025-06-07 PROCEDURE — 87086 URINE CULTURE/COLONY COUNT: CPT | Performed by: NURSE PRACTITIONER

## 2025-06-07 PROCEDURE — 81025 URINE PREGNANCY TEST: CPT | Mod: ,,, | Performed by: NURSE PRACTITIONER

## 2025-06-07 PROCEDURE — 81003 URINALYSIS AUTO W/O SCOPE: CPT | Mod: QW,,, | Performed by: NURSE PRACTITIONER

## 2025-06-07 PROCEDURE — 99213 OFFICE O/P EST LOW 20 MIN: CPT | Mod: ,,, | Performed by: NURSE PRACTITIONER

## 2025-06-07 RX ORDER — PHENAZOPYRIDINE HYDROCHLORIDE 100 MG/1
100 TABLET, FILM COATED ORAL 3 TIMES DAILY PRN
Qty: 6 TABLET | Refills: 0 | Status: SHIPPED | OUTPATIENT
Start: 2025-06-07 | End: 2025-06-09

## 2025-06-07 RX ORDER — NITROFURANTOIN 25; 75 MG/1; MG/1
100 CAPSULE ORAL 2 TIMES DAILY
Qty: 14 CAPSULE | Refills: 0 | Status: SHIPPED | OUTPATIENT
Start: 2025-06-07 | End: 2025-06-14

## 2025-06-07 NOTE — PROGRESS NOTES
"Subjective:      Patient ID: Jay John is a 34 y.o. female.    Vitals:  height is 5' 3" (1.6 m) and weight is 89.4 kg (197 lb). Her temperature is 98.5 °F (36.9 °C). Her blood pressure is 126/81 and her pulse is 72. Her respiration is 18 and oxygen saturation is 100%.     Chief Complaint: Dysuria    34 y.o. female presents to clinic with c/o frequent and painful urination x 1 week.       Genitourinary:  Positive for dysuria and frequency.      Objective:     Physical Exam   Constitutional: She is oriented to person, place, and time. She appears well-developed.   HENT:   Head: Normocephalic and atraumatic.   Ears:   Right Ear: External ear normal.   Left Ear: External ear normal.   Nose: Nose normal.   Mouth/Throat: Mucous membranes are normal.   Eyes: Conjunctivae and lids are normal.   Neck: Trachea normal. Neck supple.   Cardiovascular: Normal rate, regular rhythm and normal heart sounds.   Pulmonary/Chest: Effort normal and breath sounds normal. No respiratory distress.   Abdominal: Normal appearance and bowel sounds are normal. She exhibits no distension and no mass. Soft. There is no abdominal tenderness.   Musculoskeletal: Normal range of motion.         General: Normal range of motion.   Neurological: She is alert and oriented to person, place, and time. She has normal strength.   Skin: Skin is warm, dry, intact, not diaphoretic and not pale.   Psychiatric: Her speech is normal and behavior is normal. Judgment and thought content normal.   Nursing note and vitals reviewed.    Previous History:     Review of patient's allergies indicates:   Allergen Reactions    Shrimp Anaphylaxis    Iodine and iodide containing products      Lip swelling       Past Medical History:   Diagnosis Date    Anxiety     Sleep apnea, unspecified      Current Outpatient Medications   Medication Instructions    citalopram (CELEXA) 10 mg    diazePAM (VALIUM) 10 mg, Oral, On Call Procedure    fluticasone propionate (FLONASE) 50 " mcg, Each Nostril, 2 times daily PRN    hydrOXYzine pamoate (VISTARIL) 25 mg, Nightly PRN    loratadine (CLARITIN) 10 mg    nitrofurantoin, macrocrystal-monohydrate, (MACROBID) 100 MG capsule 100 mg, Oral, 2 times daily    norgestimate-ethinyl estradioL (SPRINTEC, 28,) 0.25-35 mg-mcg per tablet 1 tablet, Oral, Daily    omeprazole (PRILOSEC) 40 mg    phenazopyridine (PYRIDIUM) 100 mg, Oral, 3 times daily PRN     Past Surgical History:   Procedure Laterality Date    ABSCESS DRAINAGE  2009    L leg     Family History   Problem Relation Name Age of Onset    No Known Problems Mother      No Known Problems Father      No Known Problems Sister      No Known Problems Daughter      No Known Problems Maternal Aunt      No Known Problems Maternal Uncle      No Known Problems Paternal Aunt      No Known Problems Paternal Uncle      Ovarian cancer Maternal Grandmother      No Known Problems Maternal Grandfather      No Known Problems Paternal Grandmother      No Known Problems Paternal Grandfather      No Known Problems Other      Breast cancer Neg Hx      BRCA 1/2 Neg Hx         Social History[1]  Assessment:     1. Dysuria    2. Acute cystitis without hematuria      Office Visit on 06/07/2025   Component Date Value Ref Range Status    POC Blood, Urine 06/07/2025 Negative  Negative Final    POC Bilirubin, Urine 06/07/2025 Positive (A)  Negative Final    POC Urobilinogen, Urine 06/07/2025 2 (A)  0.1 - 1.1 Final    POC Ketones, Urine 06/07/2025 Negative  Negative Final    POC Protein, Urine 06/07/2025 Positive (A)  Negative Final    POC Nitrates, Urine 06/07/2025 Negative  Negative Final    POC Glucose, Urine 06/07/2025 Negative  Negative Final    pH, UA 06/07/2025 6.5   Final    POC Specific Gravity, Urine 06/07/2025 1.015  1.003 - 1.029 Final    POC Leukocytes, Urine 06/07/2025 Positive (A)  Negative Final   UPT negative    Plan:   Maintain adequate hydration.   Take prescription nitrofurantoin and Pyridium as directed    Monitor symptoms closely.   Take Tylenol or ibuprofen OTC for fever and pain   Watch out for worsening urinary symptoms, blood in the urine, flank pain, fever, chills, myalgias, and vomiting.    Seek further medical attention immediately at the 1st sign.    Follow-up with your PCP within 72 hours.    We will call you with urine culture results within the next 24-48 hours.     Dysuria  -     POCT Urinalysis, Dipstick, Automated, W/O Scope    Acute cystitis without hematuria  -     nitrofurantoin, macrocrystal-monohydrate, (MACROBID) 100 MG capsule; Take 1 capsule (100 mg total) by mouth 2 (two) times daily. for 7 days  Dispense: 14 capsule; Refill: 0  -     phenazopyridine (PYRIDIUM) 100 MG tablet; Take 1 tablet (100 mg total) by mouth 3 (three) times daily as needed for Pain.  Dispense: 6 tablet; Refill: 0  -     Urine Culture High Risk                         [1]   Social History  Tobacco Use    Smoking status: Never    Smokeless tobacco: Never   Substance Use Topics    Alcohol use: Not Currently    Drug use: Never

## 2025-06-07 NOTE — PATIENT INSTRUCTIONS
Maintain adequate hydration.   Take prescription nitrofurantoin and Pyridium as directed   Monitor symptoms closely.   Take Tylenol or ibuprofen OTC for fever and pain   Watch out for worsening urinary symptoms, blood in the urine, flank pain, fever, chills, myalgias, and vomiting.    Seek further medical attention immediately at the 1st sign.    Follow-up with your PCP within 72 hours.    We will call you with urine culture results within the next 24-48 hours.

## 2025-06-08 ENCOUNTER — RESULTS FOLLOW-UP (OUTPATIENT)
Dept: URGENT CARE | Facility: CLINIC | Age: 35
End: 2025-06-08

## 2025-06-09 LAB — BACTERIA UR CULT: NORMAL

## 2025-06-18 ENCOUNTER — OFFICE VISIT (OUTPATIENT)
Dept: URGENT CARE | Facility: CLINIC | Age: 35
End: 2025-06-18
Payer: MEDICAID

## 2025-06-18 VITALS
HEIGHT: 63 IN | SYSTOLIC BLOOD PRESSURE: 111 MMHG | HEART RATE: 90 BPM | TEMPERATURE: 99 F | DIASTOLIC BLOOD PRESSURE: 69 MMHG | WEIGHT: 197 LBS | BODY MASS INDEX: 34.91 KG/M2 | RESPIRATION RATE: 18 BRPM | OXYGEN SATURATION: 98 %

## 2025-06-18 DIAGNOSIS — Z20.2 POSSIBLE EXPOSURE TO STD: Primary | ICD-10-CM

## 2025-06-18 PROCEDURE — 99212 OFFICE O/P EST SF 10 MIN: CPT | Mod: ,,, | Performed by: NURSE PRACTITIONER

## 2025-06-18 PROCEDURE — 87661 TRICHOMONAS VAGINALIS AMPLIF: CPT | Performed by: NURSE PRACTITIONER

## 2025-06-18 NOTE — PATIENT INSTRUCTIONS
INSTRUCTIONS:            A sexually transmitted disease (STD) means signs or symptoms of a sexually transmitted infection (STI) have developed. An STI happens when bacteria or a virus are spread through oral, genital, or anal sex. Some examples of STDs are HIV, chlamydia, syphilis, and gonorrhea.    DISCHARGE INSTRUCTIONS:    Return to the emergency department if:  You have genital swelling or pain, or unusual bleeding.  You have joint pain, a rash, swollen lymph nodes, or night sweats.  You have severe abdominal pain.    Call your doctor if:    You have a fever.  Your symptoms do not go away or get worse, even after treatment.  You have bleeding or pain during sex.  You or your female partner may be pregnant.  You have questions or concerns about your condition or care.  Medicines:    You may need any of the following:    Antibiotics may be given for a bacterial infection.  Antivirals may be given for a viral infection.  Antifungals may be given for a fungal infection, such as a yeast infection.    Take your medicine as directed. Contact your healthcare provider if you think your medicine is not helping or if you have side effects. Tell your provider if you are allergic to any medicine. Keep a list of the medicines, vitamins, and herbs you take. Include the amounts, and when and why you take them. Bring the list or the pill bottles to follow-up visits. Carry your medicine list with you in case of an emergency.    Help prevent the spread of an STI:    Ask your healthcare provider for more information about the following safe sex practices:    Use a male or female condom during sex. This includes oral, genital, or anal sex. Use a new condom each time. Condoms help prevent pregnancy and STIs. Use latex condoms, if possible. Lambskin (also called sheepskin or natural membrane) condoms do not protect against STIs. A polyurethane condom can be used if you or your partner is allergic to latex. Condoms should be used with  a second form of birth control to help prevent pregnancy and STIs. Do not use male and female condoms together.    Do not have sex with someone who has an STI or STD. This includes oral and anal sex.  Limit sex partners. Ask about your partner's sex history before you have sex.  Get screened regularly if you are sexually active. Common tests include chlamydia, gonorrhea, HIV, hepatitis, and syphilis.  Tell your sex partners if you have an STI. Your partners may need to be tested and treated. Do not have sex while you are being treated for an STI. Do not have sex with a partner who is being treated.    Ask about medicines to lower your risk for some STIs:    Vaccines can help protect you from hepatitis A, hepatitis B, and the human papillomavirus (HPV). The HPV vaccine is usually given at 11 years, but it may be given through 26 years to both females and males. Your provider can give you more information on vaccines to prevent STIs.    Pre-exposure prophylaxis (PrEP) may be given if you are at high risk for HIV. PrEP is taken every day to prevent the virus from fully infecting the body.    If you are a woman, do not douche. Douching upsets the normal balance of bacteria found in your vagina. It does not prevent or clear up vaginal infections.

## 2025-06-18 NOTE — PROGRESS NOTES
"Subjective:      Patient ID: Jay John is a 34 y.o. female.    Vitals:  height is 5' 3" (1.6 m) and weight is 89.4 kg (197 lb). Her oral temperature is 98.8 °F (37.1 °C). Her blood pressure is 111/69 and her pulse is 90. Her respiration is 18 and oxygen saturation is 98%.     Chief Complaint: Exposure to STD     Patient is a 34 y.o. female who presents to urgent care for std testing. Pt states she hasn't had any new partners but may have been cheated on (possible exposure). Pt denies any Std symptoms.  Patient states she is asymptomatic        Constitution: Negative.   HENT: Negative.     Neck: neck negative.   Cardiovascular: Negative.    Eyes: Negative.    Respiratory: Negative.     Gastrointestinal: Negative.    Endocrine: negative.   Genitourinary: Negative.    Musculoskeletal: Negative.    Skin: Negative.    Allergic/Immunologic: Negative.    Neurological: Negative.    Hematologic/Lymphatic: Negative.    Psychiatric/Behavioral: Negative.        Objective:     Physical Exam   Constitutional: She is oriented to person, place, and time. She appears well-developed. She is cooperative. She does not appear ill.   HENT:   Head: Normocephalic and atraumatic.   Ears:   Right Ear: Hearing, tympanic membrane, external ear and ear canal normal.   Left Ear: Hearing, tympanic membrane, external ear and ear canal normal.   Nose: Nose normal. No mucosal edema or nasal deformity. No epistaxis. Right sinus exhibits no maxillary sinus tenderness and no frontal sinus tenderness. Left sinus exhibits no maxillary sinus tenderness and no frontal sinus tenderness.   Mouth/Throat: Uvula is midline, oropharynx is clear and moist and mucous membranes are normal. No trismus in the jaw. Normal dentition. No uvula swelling.   Eyes: Conjunctivae and lids are normal.   Neck: Trachea normal and phonation normal. Neck supple.   Cardiovascular: Normal rate, regular rhythm, normal heart sounds and normal pulses.   Pulmonary/Chest: Effort " normal and breath sounds normal.   Abdominal: Normal appearance and bowel sounds are normal. Soft.   Musculoskeletal: Normal range of motion.         General: Normal range of motion.   Lymphadenopathy:     She has no cervical adenopathy.   Neurological: no focal deficit. She is alert and oriented to person, place, and time. She exhibits normal muscle tone.   Skin: Skin is warm, dry and intact. Capillary refill takes less than 2 seconds.   Psychiatric: Her speech is normal and behavior is normal. Judgment and thought content normal.   Nursing note and vitals reviewed.       Previous History      Review of patient's allergies indicates:   Allergen Reactions    Shrimp Anaphylaxis    Iodine and iodide containing products      Lip swelling       Past Medical History:   Diagnosis Date    Anxiety     Sleep apnea, unspecified      Current Outpatient Medications   Medication Instructions    citalopram (CELEXA) 10 mg    diazePAM (VALIUM) 10 mg, Oral, On Call Procedure    fluticasone propionate (FLONASE) 50 mcg, Each Nostril, 2 times daily PRN    hydrOXYzine pamoate (VISTARIL) 25 mg, Nightly PRN    loratadine (CLARITIN) 10 mg    norgestimate-ethinyl estradioL (SPRINTEC, 28,) 0.25-35 mg-mcg per tablet 1 tablet, Oral, Daily    omeprazole (PRILOSEC) 40 mg     Past Surgical History:   Procedure Laterality Date    ABSCESS DRAINAGE  2009    L leg     Family History   Problem Relation Name Age of Onset    No Known Problems Mother      No Known Problems Father      No Known Problems Sister      No Known Problems Daughter      No Known Problems Maternal Aunt      No Known Problems Maternal Uncle      No Known Problems Paternal Aunt      No Known Problems Paternal Uncle      Ovarian cancer Maternal Grandmother      No Known Problems Maternal Grandfather      No Known Problems Paternal Grandmother      No Known Problems Paternal Grandfather      No Known Problems Other      Breast cancer Neg Hx      BRCA 1/2 Neg Hx         Social  "History[1]     Physical Exam      Vital Signs Reviewed   /69   Pulse 90   Temp 98.8 °F (37.1 °C) (Oral)   Resp 18   Ht 5' 3" (1.6 m)   Wt 89.4 kg (197 lb)   LMP 06/09/2025 (Approximate)   SpO2 98%   BMI 34.90 kg/m²        Procedures    Procedures     Labs     Results for orders placed or performed in visit on 06/07/25   POCT Urinalysis, Dipstick, Automated, W/O Scope    Collection Time: 06/07/25  9:14 AM   Result Value Ref Range    POC Blood, Urine Negative Negative    POC Bilirubin, Urine Positive (A) Negative    POC Urobilinogen, Urine 2 (A) 0.1 - 1.1    POC Ketones, Urine Negative Negative    POC Protein, Urine Positive (A) Negative    POC Nitrates, Urine Negative Negative    POC Glucose, Urine Negative Negative    pH, UA 6.5     POC Specific Gravity, Urine 1.015 1.003 - 1.029    POC Leukocytes, Urine Positive (A) Negative   POCT urine pregnancy    Collection Time: 06/07/25  9:42 AM   Result Value Ref Range    POC Preg Test, Ur Negative Negative     Acceptable Yes    Urine Culture High Risk    Collection Time: 06/07/25  4:05 PM    Specimen: Urine, Clean Catch   Result Value Ref Range    Urine Culture No Significant Growth        Assessment:     1. Possible exposure to STD        Plan:   INSTRUCTIONS:      A sexually transmitted disease (STD) means signs or symptoms of a sexually transmitted infection (STI) have developed. An STI happens when bacteria or a virus are spread through oral, genital, or anal sex. Some examples of STDs are HIV, chlamydia, syphilis, and gonorrhea.    DISCHARGE INSTRUCTIONS:    Return to the emergency department if:  You have genital swelling or pain, or unusual bleeding.  You have joint pain, a rash, swollen lymph nodes, or night sweats.  You have severe abdominal pain.    Call your doctor if:    You have a fever.  Your symptoms do not go away or get worse, even after treatment.  You have bleeding or pain during sex.  You or your female partner may be " pregnant.  You have questions or concerns about your condition or care.  Medicines:    You may need any of the following:    Antibiotics may be given for a bacterial infection.  Antivirals may be given for a viral infection.  Antifungals may be given for a fungal infection, such as a yeast infection.    Take your medicine as directed. Contact your healthcare provider if you think your medicine is not helping or if you have side effects. Tell your provider if you are allergic to any medicine. Keep a list of the medicines, vitamins, and herbs you take. Include the amounts, and when and why you take them. Bring the list or the pill bottles to follow-up visits. Carry your medicine list with you in case of an emergency.    Help prevent the spread of an STI:    Ask your healthcare provider for more information about the following safe sex practices:    Use a male or female condom during sex. This includes oral, genital, or anal sex. Use a new condom each time. Condoms help prevent pregnancy and STIs. Use latex condoms, if possible. Lambskin (also called sheepskin or natural membrane) condoms do not protect against STIs. A polyurethane condom can be used if you or your partner is allergic to latex. Condoms should be used with a second form of birth control to help prevent pregnancy and STIs. Do not use male and female condoms together.    Do not have sex with someone who has an STI or STD. This includes oral and anal sex.  Limit sex partners. Ask about your partner's sex history before you have sex.  Get screened regularly if you are sexually active. Common tests include chlamydia, gonorrhea, HIV, hepatitis, and syphilis.  Tell your sex partners if you have an STI. Your partners may need to be tested and treated. Do not have sex while you are being treated for an STI. Do not have sex with a partner who is being treated.    Ask about medicines to lower your risk for some STIs:    Vaccines can help protect you from hepatitis  A, hepatitis B, and the human papillomavirus (HPV). The HPV vaccine is usually given at 11 years, but it may be given through 26 years to both females and males. Your provider can give you more information on vaccines to prevent STIs.    Pre-exposure prophylaxis (PrEP) may be given if you are at high risk for HIV. PrEP is taken every day to prevent the virus from fully infecting the body.    If you are a woman, do not douche. Douching upsets the normal balance of bacteria found in your vagina. It does not prevent or clear up vaginal infections.      Possible exposure to STD  -     Sexually-Transmitted Infections (STIs) Increased Risk Panel; Future; Expected date: 06/18/2025                         [1]   Social History  Tobacco Use    Smoking status: Never    Smokeless tobacco: Never   Substance Use Topics    Alcohol use: Not Currently    Drug use: Never

## 2025-06-19 ENCOUNTER — RESULTS FOLLOW-UP (OUTPATIENT)
Dept: URGENT CARE | Facility: CLINIC | Age: 35
End: 2025-06-19

## 2025-06-19 LAB
C TRACH RRNA UR QL NAA+PROBE: NOT DETECTED
N GONORRHOEA DNA UR QL NAA+PROBE: NOT DETECTED
T VAGINALIS RRNA UR QL NAA+PROBE: NOT DETECTED